# Patient Record
Sex: MALE | Race: WHITE | Employment: FULL TIME | URBAN - METROPOLITAN AREA
[De-identification: names, ages, dates, MRNs, and addresses within clinical notes are randomized per-mention and may not be internally consistent; named-entity substitution may affect disease eponyms.]

---

## 2017-01-17 DIAGNOSIS — I10 ESSENTIAL HYPERTENSION: Primary | ICD-10-CM

## 2017-01-17 RX ORDER — LISINOPRIL 20 MG/1
TABLET ORAL
Qty: 180 TABLET | Refills: 0 | Status: SHIPPED | OUTPATIENT
Start: 2017-01-17 | End: 2017-02-16 | Stop reason: SDUPTHER

## 2017-02-03 RX ORDER — METOPROLOL SUCCINATE 50 MG/1
TABLET, EXTENDED RELEASE ORAL
Qty: 180 TABLET | Refills: 0 | Status: SHIPPED | OUTPATIENT
Start: 2017-02-03 | End: 2017-02-16 | Stop reason: SDUPTHER

## 2017-07-13 ENCOUNTER — TELEPHONE (OUTPATIENT)
Dept: FAMILY MEDICINE CLINIC | Age: 52
End: 2017-07-13

## 2017-07-17 RX ORDER — LISINOPRIL 40 MG/1
40 TABLET ORAL 2 TIMES DAILY
Qty: 60 TABLET | Refills: 0
Start: 2017-07-17 | End: 2017-08-18 | Stop reason: SDUPTHER

## 2017-07-25 ENCOUNTER — TELEPHONE (OUTPATIENT)
Dept: FAMILY MEDICINE CLINIC | Age: 52
End: 2017-07-25

## 2017-07-28 ENCOUNTER — TELEPHONE (OUTPATIENT)
Dept: FAMILY MEDICINE CLINIC | Age: 52
End: 2017-07-28

## 2017-07-28 RX ORDER — AMLODIPINE BESYLATE 10 MG/1
10 TABLET ORAL DAILY
Qty: 30 TABLET | Refills: 3 | Status: SHIPPED | OUTPATIENT
Start: 2017-07-28 | End: 2018-08-24 | Stop reason: SDUPTHER

## 2017-08-17 ENCOUNTER — TELEPHONE (OUTPATIENT)
Dept: FAMILY MEDICINE CLINIC | Age: 52
End: 2017-08-17

## 2017-08-18 RX ORDER — LISINOPRIL 40 MG/1
40 TABLET ORAL 2 TIMES DAILY
Qty: 180 TABLET | Refills: 0 | Status: SHIPPED | OUTPATIENT
Start: 2017-08-18 | End: 2017-10-29 | Stop reason: SDUPTHER

## 2017-09-19 ENCOUNTER — TELEPHONE (OUTPATIENT)
Dept: FAMILY MEDICINE CLINIC | Age: 52
End: 2017-09-19

## 2017-09-19 DIAGNOSIS — I10 UNCONTROLLED HYPERTENSION: Primary | ICD-10-CM

## 2017-09-19 NOTE — TELEPHONE ENCOUNTER
Per conversation Thursday or Friday 9/14 or 9/15 with pt - call was dropped; pt's wife called to find out sounded like maybe do some more tests regarding pt's blood pressure.     Please call and advise

## 2017-09-26 NOTE — TELEPHONE ENCOUNTER
So according to CT @ [facility] a renal CTA only shows arteries, need to know what you are looking for? May need to order CTA abdomen or CTA abdomen & pelvis.  Please advise

## 2017-09-29 NOTE — TELEPHONE ENCOUNTER
Spoke with patient, advised test approved, but to be done @ [facility] @ [place]. Patient will discuss this with wife and call back office on Monday 10/2/17.

## 2017-11-01 RX ORDER — LISINOPRIL 40 MG/1
TABLET ORAL
Qty: 180 TABLET | Refills: 0 | Status: SHIPPED | OUTPATIENT
Start: 2017-11-01 | End: 2018-03-27 | Stop reason: SDUPTHER

## 2018-03-14 ENCOUNTER — OFFICE VISIT (OUTPATIENT)
Dept: FAMILY MEDICINE CLINIC | Age: 53
End: 2018-03-14

## 2018-03-14 VITALS
OXYGEN SATURATION: 98 % | WEIGHT: 251 LBS | BODY MASS INDEX: 31.21 KG/M2 | HEIGHT: 75 IN | SYSTOLIC BLOOD PRESSURE: 150 MMHG | DIASTOLIC BLOOD PRESSURE: 100 MMHG | HEART RATE: 55 BPM

## 2018-03-14 DIAGNOSIS — F17.200 TOBACCO DEPENDENCE: ICD-10-CM

## 2018-03-14 DIAGNOSIS — I10 ESSENTIAL HYPERTENSION: Primary | ICD-10-CM

## 2018-03-14 PROCEDURE — 99214 OFFICE O/P EST MOD 30 MIN: CPT | Performed by: FAMILY MEDICINE

## 2018-03-14 RX ORDER — M-VIT,TX,IRON,MINS/CALC/FOLIC 27MG-0.4MG
1 TABLET ORAL DAILY
COMMUNITY

## 2018-03-14 RX ORDER — ASPIRIN 81 MG/1
81 TABLET, CHEWABLE ORAL DAILY
COMMUNITY

## 2018-03-14 RX ORDER — CLONIDINE HYDROCHLORIDE 0.1 MG/1
0.1 TABLET ORAL 2 TIMES DAILY
Qty: 60 TABLET | Refills: 3 | Status: SHIPPED | OUTPATIENT
Start: 2018-03-14 | End: 2018-03-21 | Stop reason: DRUGHIGH

## 2018-03-14 RX ORDER — METOPROLOL SUCCINATE 100 MG/1
100 TABLET, EXTENDED RELEASE ORAL 2 TIMES DAILY
Qty: 180 TABLET | Refills: 1
Start: 2018-03-14 | End: 2018-04-28 | Stop reason: SDUPTHER

## 2018-03-14 ASSESSMENT — PATIENT HEALTH QUESTIONNAIRE - PHQ9
SUM OF ALL RESPONSES TO PHQ QUESTIONS 1-9: 0
SUM OF ALL RESPONSES TO PHQ9 QUESTIONS 1 & 2: 0
1. LITTLE INTEREST OR PLEASURE IN DOING THINGS: 0
2. FEELING DOWN, DEPRESSED OR HOPELESS: 0

## 2018-03-16 ENCOUNTER — TELEPHONE (OUTPATIENT)
Dept: FAMILY MEDICINE CLINIC | Age: 53
End: 2018-03-16

## 2018-03-16 NOTE — TELEPHONE ENCOUNTER
Per Jorgito Smoker would like to know if the U/S retroperitoneal dylan pre-cert is completed? Pt is currently there.     Please call

## 2018-03-21 ENCOUNTER — TELEPHONE (OUTPATIENT)
Dept: FAMILY MEDICINE CLINIC | Age: 53
End: 2018-03-21

## 2018-03-21 RX ORDER — CLONIDINE HYDROCHLORIDE 0.2 MG/1
0.2 TABLET ORAL 2 TIMES DAILY
Qty: 60 TABLET | Refills: 3 | Status: SHIPPED | OUTPATIENT
Start: 2018-03-21 | End: 2019-08-28

## 2018-03-21 NOTE — TELEPHONE ENCOUNTER
cloNIDine (CATAPRES) 0.1 MG tablet 60 tablet 3 3/14/2018     Sig - Route: Take 1 tablet by mouth 2 times daily - Oral      Pt's wife calling to state that pt's BP have been averaging very high. Averaging 150/108. Last night was 174/101  This morning 141/101    Would like to know if med above should be increased?     Please call and let her know    [id number]  [address]el[address]za [address]hen[address] [facility] [phone]

## 2018-04-13 ENCOUNTER — TELEPHONE (OUTPATIENT)
Dept: FAMILY MEDICINE CLINIC | Age: 53
End: 2018-04-13

## 2018-04-13 RX ORDER — HYDRALAZINE HYDROCHLORIDE 10 MG/1
10 TABLET, FILM COATED ORAL 3 TIMES DAILY
Qty: 90 TABLET | Refills: 3 | Status: SHIPPED | OUTPATIENT
Start: 2018-04-13 | End: 2018-08-24 | Stop reason: SDUPTHER

## 2018-04-16 ENCOUNTER — TELEPHONE (OUTPATIENT)
Dept: FAMILY MEDICINE CLINIC | Age: 53
End: 2018-04-16

## 2018-04-16 DIAGNOSIS — I10 UNCONTROLLED HYPERTENSION: Primary | ICD-10-CM

## 2018-04-25 ENCOUNTER — HOSPITAL ENCOUNTER (OUTPATIENT)
Dept: OTHER | Age: 53
Discharge: HOME | End: 2018-04-25
Attending: INTERNAL MEDICINE | Admitting: INTERNAL MEDICINE

## 2018-04-25 LAB
BILIRUBIN URINE: NEGATIVE
BLOOD, URINE: NEGATIVE
CLARITY: CLEAR
COLOR: YELLOW
CORTISOL TOTAL: 2.6 UG/DL
GLUCOSE URINE: NEGATIVE MG/DL
KETONES, URINE: NEGATIVE MG/DL
LEUKOCYTE ESTERASE, URINE: NEGATIVE
MICROSCOPIC EXAMINATION: NORMAL
NITRITE, URINE: NEGATIVE
PARATHYROID HORMONE INTACT: 38.7 PG/ML (ref 14–72)
PH UA: 6
PROTEIN UA: NEGATIVE MG/DL
SPECIFIC GRAVITY UA: 1.02
TSH SERPL DL<=0.05 MIU/L-ACNC: 3.94 UIU/ML (ref 0.27–4.2)
URINE TYPE: NORMAL
UROBILINOGEN, URINE: 0.2 E.U./DL

## 2018-04-26 ENCOUNTER — OFFICE VISIT (OUTPATIENT)
Dept: FAMILY MEDICINE CLINIC | Age: 53
End: 2018-04-26

## 2018-04-26 VITALS
SYSTOLIC BLOOD PRESSURE: 170 MMHG | BODY MASS INDEX: 29.18 KG/M2 | HEART RATE: 59 BPM | DIASTOLIC BLOOD PRESSURE: 112 MMHG | OXYGEN SATURATION: 98 % | WEIGHT: 235 LBS

## 2018-04-26 DIAGNOSIS — I10 ESSENTIAL HYPERTENSION: Primary | ICD-10-CM

## 2018-04-26 PROCEDURE — 99212 OFFICE O/P EST SF 10 MIN: CPT | Performed by: FAMILY MEDICINE

## 2018-04-26 RX ORDER — HYDROCHLOROTHIAZIDE 12.5 MG/1
CAPSULE, GELATIN COATED ORAL
COMMUNITY
Start: 2018-04-25 | End: 2019-08-28 | Stop reason: SDUPTHER

## 2018-04-26 RX ORDER — SPIRONOLACTONE 50 MG/1
TABLET, FILM COATED ORAL
COMMUNITY
Start: 2018-04-25 | End: 2019-08-16 | Stop reason: DRUGHIGH

## 2018-05-03 LAB
CATECHOLAMINE INTERPRETATION, PLASMA: NORMAL
DOPAMINE PLASMA: <20 PG/ML (ref 0–20)
EPINEPHRINE PLASMA: 21 PG/ML (ref 10–200)
NOREPINEPHRINE: 375 PG/ML (ref 80–520)

## 2018-05-04 LAB — ALDOSTERONE: 8.9 NG/DL

## 2018-05-07 LAB — RENIN ACTIVITY: 0.2 NG/ML/HR

## 2018-08-17 ENCOUNTER — OFFICE VISIT (OUTPATIENT)
Dept: FAMILY MEDICINE CLINIC | Age: 53
End: 2018-08-17

## 2018-08-17 VITALS
BODY MASS INDEX: 29.92 KG/M2 | OXYGEN SATURATION: 98 % | HEART RATE: 56 BPM | SYSTOLIC BLOOD PRESSURE: 110 MMHG | DIASTOLIC BLOOD PRESSURE: 86 MMHG | WEIGHT: 241 LBS

## 2018-08-17 DIAGNOSIS — R20.2 PARESTHESIA OF UPPER LIMB: ICD-10-CM

## 2018-08-17 DIAGNOSIS — M79.644 THUMB PAIN, RIGHT: Primary | ICD-10-CM

## 2018-08-17 DIAGNOSIS — M54.2 NECK PAIN: ICD-10-CM

## 2018-08-17 PROCEDURE — 99213 OFFICE O/P EST LOW 20 MIN: CPT | Performed by: FAMILY MEDICINE
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
## 2018-08-24 RX ORDER — HYDRALAZINE HYDROCHLORIDE 10 MG/1
10 TABLET, FILM COATED ORAL 3 TIMES DAILY
Qty: 90 TABLET | Refills: 3 | Status: SHIPPED | OUTPATIENT
Start: 2018-08-24 | End: 2018-11-21 | Stop reason: SDUPTHER

## 2018-08-24 RX ORDER — AMLODIPINE BESYLATE 10 MG/1
TABLET ORAL
Qty: 90 TABLET | Refills: 2 | Status: SHIPPED | OUTPATIENT
Start: 2018-08-24 | End: 2019-05-12 | Stop reason: SDUPTHER

## 2018-11-19 ENCOUNTER — PROCEDURE VISIT (OUTPATIENT)
Dept: NEUROLOGY | Age: 53
End: 2018-11-19
Payer: COMMERCIAL

## 2018-11-19 DIAGNOSIS — G56.23 ULNAR NEUROPATHY OF BOTH UPPER EXTREMITIES: Primary | ICD-10-CM

## 2018-11-19 PROCEDURE — 95911 NRV CNDJ TEST 9-10 STUDIES: CPT | Performed by: PSYCHIATRY & NEUROLOGY

## 2018-11-19 PROCEDURE — 95886 MUSC TEST DONE W/N TEST COMP: CPT | Performed by: PSYCHIATRY & NEUROLOGY

## 2018-11-19 NOTE — PROGRESS NOTES
Talia Campos M.D. [facility]) Physicians/[facility]  Board Certified in [address] [facility] [address] [facility], [address] [facility], [address] [facility]    EMG / NERVE CONDUCTION STUDY    PATIENT:     Rosey Bond      DATE OF EM2018    YOB: 1965       REASON FOR EMG:    Bilateral arm numbness    REFERRING PHYSICIAN:  Omaira Chaidez MD  [facility] [address] [facility], [address]    SUMMARY:  Bilateral median sensory nerve studies had slightly prolonged distal latencies. Bilateral median motor nerve studies were normal.  Bilateral ulnar sensory studies and borderline distal latencies. Bilateral ulnar motor nerve studies had moderately severe slowing of conduction velocities across the elbow. The right radial sensory nerve study was normal.  Needle EMG of several muscles in both upper extremities including the cervical paraspinal muscles was normal.     CLINICAL DIAGNOSIS:  Unspecified neuropathy     EMG RESULTS:   1.  Moderately severe bilateral ulnar nerve lesions at the elbow. The right side is slightly more involved than the left side. 2.  Mild bilateral median nerve lesions at the wrist.  (Carpal tunnel syndrome). _____________________________  Talia Campos M.D.   Electromyographer/Neurologist

## 2018-11-20 ENCOUNTER — TELEPHONE (OUTPATIENT)
Dept: FAMILY MEDICINE CLINIC | Age: 53
End: 2018-11-20

## 2018-11-20 DIAGNOSIS — M54.2 NECK PAIN: Primary | ICD-10-CM

## 2018-11-21 RX ORDER — HYDRALAZINE HYDROCHLORIDE 10 MG/1
10 TABLET, FILM COATED ORAL 3 TIMES DAILY
Qty: 270 TABLET | Refills: 0 | Status: SHIPPED | OUTPATIENT
Start: 2018-11-21 | End: 2019-02-18 | Stop reason: SDUPTHER

## 2018-12-13 RX ORDER — LISINOPRIL 40 MG/1
TABLET ORAL
Qty: 180 TABLET | Refills: 1 | Status: SHIPPED | OUTPATIENT
Start: 2018-12-13 | End: 2019-07-01 | Stop reason: SDUPTHER

## 2019-01-28 RX ORDER — METOPROLOL SUCCINATE 100 MG/1
TABLET, EXTENDED RELEASE ORAL
Qty: 180 TABLET | Refills: 2 | Status: SHIPPED | OUTPATIENT
Start: 2019-01-28 | End: 2019-08-28 | Stop reason: SDUPTHER

## 2019-02-08 ENCOUNTER — HOSPITAL ENCOUNTER (OUTPATIENT)
Dept: MRI IMAGING | Age: 54
Discharge: HOME | End: 2019-02-08
Payer: COMMERCIAL

## 2019-02-08 DIAGNOSIS — M54.12 BRACHIAL NEURITIS: ICD-10-CM

## 2019-02-08 DIAGNOSIS — R55 SYNCOPE AND COLLAPSE: ICD-10-CM

## 2019-02-08 PROCEDURE — 70547 MR ANGIOGRAPHY NECK W/O DYE: CPT

## 2019-02-08 PROCEDURE — 72141 MRI NECK SPINE W/O DYE: CPT

## 2019-02-18 RX ORDER — HYDRALAZINE HYDROCHLORIDE 10 MG/1
10 TABLET, FILM COATED ORAL 3 TIMES DAILY
Qty: 270 TABLET | Refills: 0 | Status: SHIPPED | OUTPATIENT
Start: 2019-02-18 | End: 2019-05-14 | Stop reason: SDUPTHER

## 2019-05-13 RX ORDER — AMLODIPINE BESYLATE 10 MG/1
TABLET ORAL
Qty: 90 TABLET | Refills: 0 | Status: SHIPPED | OUTPATIENT
Start: 2019-05-13 | End: 2019-08-10 | Stop reason: SDUPTHER

## 2019-05-15 RX ORDER — HYDRALAZINE HYDROCHLORIDE 10 MG/1
TABLET, FILM COATED ORAL
Qty: 270 TABLET | Refills: 0 | Status: SHIPPED | OUTPATIENT
Start: 2019-05-15 | End: 2019-08-10 | Stop reason: SDUPTHER

## 2019-07-01 RX ORDER — LISINOPRIL 40 MG/1
TABLET ORAL
Qty: 60 TABLET | Refills: 0 | Status: SHIPPED | OUTPATIENT
Start: 2019-07-01 | End: 2019-08-02 | Stop reason: SDUPTHER

## 2019-07-19 ENCOUNTER — OFFICE VISIT (OUTPATIENT)
Dept: FAMILY MEDICINE CLINIC | Age: 54
End: 2019-07-19
Payer: COMMERCIAL

## 2019-07-19 VITALS
BODY MASS INDEX: 31.04 KG/M2 | SYSTOLIC BLOOD PRESSURE: 108 MMHG | WEIGHT: 250 LBS | DIASTOLIC BLOOD PRESSURE: 70 MMHG | HEART RATE: 74 BPM | OXYGEN SATURATION: 96 %

## 2019-07-19 DIAGNOSIS — M79.671 RIGHT FOOT PAIN: Primary | ICD-10-CM

## 2019-07-19 DIAGNOSIS — I10 ESSENTIAL HYPERTENSION: ICD-10-CM

## 2019-07-19 PROCEDURE — 99213 OFFICE O/P EST LOW 20 MIN: CPT | Performed by: FAMILY MEDICINE

## 2019-07-19 ASSESSMENT — PATIENT HEALTH QUESTIONNAIRE - PHQ9
SUM OF ALL RESPONSES TO PHQ QUESTIONS 1-9: 0
SUM OF ALL RESPONSES TO PHQ9 QUESTIONS 1 & 2: 0
1. LITTLE INTEREST OR PLEASURE IN DOING THINGS: 0
SUM OF ALL RESPONSES TO PHQ QUESTIONS 1-9: 0
2. FEELING DOWN, DEPRESSED OR HOPELESS: 0

## 2019-07-29 ENCOUNTER — HOSPITAL ENCOUNTER (OUTPATIENT)
Age: 54
Discharge: HOME | End: 2019-07-29
Payer: COMMERCIAL

## 2019-07-29 DIAGNOSIS — N28.9 ABNORMAL KIDNEY FUNCTION: Primary | ICD-10-CM

## 2019-07-29 DIAGNOSIS — I10 ESSENTIAL HYPERTENSION: ICD-10-CM

## 2019-07-29 LAB
A/G RATIO: 1.6 (ref 1.1–2.2)
ALBUMIN SERPL-MCNC: 4.4 G/DL (ref 3.4–5)
ALP BLD-CCNC: 53 U/L (ref 40–129)
ALT SERPL-CCNC: 30 U/L (ref 10–40)
ANION GAP SERPL CALCULATED.3IONS-SCNC: 12 MMOL/L (ref 3–16)
AST SERPL-CCNC: 22 U/L (ref 15–37)
BILIRUB SERPL-MCNC: 0.8 MG/DL (ref 0–1)
BUN BLDV-MCNC: 32 MG/DL (ref 7–20)
CALCIUM SERPL-MCNC: 9.5 MG/DL (ref 8.3–10.6)
CHLORIDE BLD-SCNC: 105 MMOL/L (ref 99–110)
CHOLESTEROL, TOTAL: 144 MG/DL (ref 0–199)
CO2: 23 MMOL/L (ref 21–32)
CREAT SERPL-MCNC: 1.4 MG/DL (ref 0.9–1.3)
GFR AFRICAN AMERICAN: >60
GFR NON-AFRICAN AMERICAN: 53
GLOBULIN: 2.8 G/DL
GLUCOSE BLD-MCNC: 108 MG/DL (ref 70–99)
HDLC SERPL-MCNC: 43 MG/DL (ref 40–60)
LDL CHOLESTEROL CALCULATED: 72 MG/DL
POTASSIUM SERPL-SCNC: 5 MMOL/L (ref 3.5–5.1)
SODIUM BLD-SCNC: 140 MMOL/L (ref 136–145)
TOTAL PROTEIN: 7.2 G/DL (ref 6.4–8.2)
TRIGL SERPL-MCNC: 143 MG/DL (ref 0–150)
VLDLC SERPL CALC-MCNC: 29 MG/DL

## 2019-07-29 PROCEDURE — 80053 COMPREHEN METABOLIC PANEL: CPT

## 2019-07-29 PROCEDURE — 80061 LIPID PANEL: CPT

## 2019-07-29 PROCEDURE — 36415 COLL VENOUS BLD VENIPUNCTURE: CPT

## 2019-08-02 RX ORDER — LISINOPRIL 40 MG/1
TABLET ORAL
Qty: 60 TABLET | Refills: 12 | Status: SHIPPED | OUTPATIENT
Start: 2019-08-02 | End: 2020-08-25

## 2019-08-05 ENCOUNTER — HOSPITAL ENCOUNTER (OUTPATIENT)
Age: 54
Discharge: HOME | End: 2019-08-05
Payer: COMMERCIAL

## 2019-08-05 DIAGNOSIS — N28.9 ABNORMAL KIDNEY FUNCTION: ICD-10-CM

## 2019-08-05 LAB
ANION GAP SERPL CALCULATED.3IONS-SCNC: 11 MMOL/L (ref 3–16)
BUN BLDV-MCNC: 25 MG/DL (ref 7–20)
CALCIUM SERPL-MCNC: 9.4 MG/DL (ref 8.3–10.6)
CHLORIDE BLD-SCNC: 105 MMOL/L (ref 99–110)
CO2: 23 MMOL/L (ref 21–32)
CREAT SERPL-MCNC: 1.3 MG/DL (ref 0.9–1.3)
GFR AFRICAN AMERICAN: >60
GFR NON-AFRICAN AMERICAN: 57
GLUCOSE BLD-MCNC: 100 MG/DL (ref 70–99)
POTASSIUM SERPL-SCNC: 5.7 MMOL/L (ref 3.5–5.1)
SODIUM BLD-SCNC: 139 MMOL/L (ref 136–145)

## 2019-08-05 PROCEDURE — 80048 BASIC METABOLIC PNL TOTAL CA: CPT

## 2019-08-05 PROCEDURE — 36415 COLL VENOUS BLD VENIPUNCTURE: CPT

## 2019-08-06 ENCOUNTER — PATIENT MESSAGE (OUTPATIENT)
Dept: FAMILY MEDICINE CLINIC | Age: 54
End: 2019-08-06

## 2019-08-06 RX ORDER — SPIRONOLACTONE 50 MG/1
TABLET, FILM COATED ORAL
Qty: 30 TABLET | Status: CANCELLED | OUTPATIENT
Start: 2019-08-06

## 2019-08-06 NOTE — TELEPHONE ENCOUNTER
From: Katie Luke  To: Saray Dawson MD  Sent: 8/6/2019 10:22 AM EDT  Subject: Prescription Question    Spironolactone 100 mg needs refilled. [facility]said they have contacted office for refill with no response. Have 3 days left of medication. Thank you.

## 2019-08-07 RX ORDER — SPIRONOLACTONE 50 MG/1
50 TABLET, FILM COATED ORAL DAILY
Qty: 30 TABLET | Refills: 12 | Status: CANCELLED | OUTPATIENT
Start: 2019-08-07

## 2019-08-10 RX ORDER — AMLODIPINE BESYLATE 10 MG/1
TABLET ORAL
Qty: 90 TABLET | Refills: 3 | Status: SHIPPED | OUTPATIENT
Start: 2019-08-10 | End: 2020-08-12

## 2019-08-10 RX ORDER — HYDRALAZINE HYDROCHLORIDE 10 MG/1
TABLET, FILM COATED ORAL
Qty: 270 TABLET | Refills: 3 | Status: SHIPPED | OUTPATIENT
Start: 2019-08-10 | End: 2020-07-30

## 2019-08-10 NOTE — TELEPHONE ENCOUNTER
Medication:   Requested Prescriptions     Pending Prescriptions Disp Refills    hydrALAZINE (APRESOLINE) 10 MG tablet [Pharmacy Med Name: HYDRALAZINE 10 MG TABLET] 270 tablet 0     Sig: TAKE 1 TABLET BY MOUTH THREE TIMES A DAY    amLODIPine (NORVASC) 10 MG tablet [Pharmacy Med Name: AMLODIPINE BESYLATE 10 MG TAB] 90 tablet 0     Sig: TAKE 1 TABLET BY MOUTH EVERY DAY      Last Filled:  5/13/2019    Patient Phone Number: [phone] (home)     Last appt: 7/19/2019   Next appt: 8/28/2019    Last OARRS: No flowsheet data found.     Preferred Pharmacy:   [address] [place], [place] [address] [address]  Phone: [phone] Fax: [phone]

## 2019-08-16 RX ORDER — SPIRONOLACTONE 100 MG/1
100 TABLET, FILM COATED ORAL DAILY
Qty: 90 TABLET | Refills: 3
Start: 2019-08-16 | End: 2020-09-02 | Stop reason: SDUPTHER

## 2019-08-28 ENCOUNTER — OFFICE VISIT (OUTPATIENT)
Dept: FAMILY MEDICINE CLINIC | Age: 54
End: 2019-08-28
Payer: COMMERCIAL

## 2019-08-28 VITALS
WEIGHT: 253 LBS | HEART RATE: 65 BPM | DIASTOLIC BLOOD PRESSURE: 82 MMHG | BODY MASS INDEX: 31.41 KG/M2 | SYSTOLIC BLOOD PRESSURE: 110 MMHG | OXYGEN SATURATION: 96 %

## 2019-08-28 DIAGNOSIS — Z00.00 WELL ADULT EXAM: Primary | ICD-10-CM

## 2019-08-28 DIAGNOSIS — Z12.5 SCREENING PSA (PROSTATE SPECIFIC ANTIGEN): ICD-10-CM

## 2019-08-28 DIAGNOSIS — N18.30 CKD (CHRONIC KIDNEY DISEASE) STAGE 3, GFR 30-59 ML/MIN (HCC): ICD-10-CM

## 2019-08-28 DIAGNOSIS — F17.200 TOBACCO DEPENDENCE: ICD-10-CM

## 2019-08-28 DIAGNOSIS — I10 ESSENTIAL HYPERTENSION: ICD-10-CM

## 2019-08-28 PROCEDURE — 99396 PREV VISIT EST AGE 40-64: CPT | Performed by: FAMILY MEDICINE

## 2019-08-28 RX ORDER — HYDROCHLOROTHIAZIDE 12.5 MG/1
12.5 CAPSULE, GELATIN COATED ORAL EVERY MORNING
Qty: 90 CAPSULE | Refills: 3 | Status: SHIPPED | OUTPATIENT
Start: 2019-08-28 | End: 2020-09-02 | Stop reason: SDUPTHER

## 2019-08-28 RX ORDER — METOPROLOL SUCCINATE 100 MG/1
TABLET, EXTENDED RELEASE ORAL
Qty: 180 TABLET | Refills: 3 | Status: SHIPPED | OUTPATIENT
Start: 2019-08-28 | End: 2020-09-02 | Stop reason: SDUPTHER

## 2019-08-28 NOTE — PROGRESS NOTES
Here for annual physical.    Dental: has been at least 5 years  Eye: has been at least 5 years    Colonoscopy: up-to-date    Exercise: nothing   Diet: balanced     Checks BP at home: No  Taking medication daily: Yes  Side Effects of medication: no    Patient's medications, allergies, past medical, surgical, social and family histories were reviewed and updated in the EHR as appropriate. No CP, no palpitations, no sob, no edema, no cough, no change in bowel or bladder,  no nausea, no vomiting, no abdominal pain    HM reviewed with pt    Patient's medications, allergies, past medical, surgical, social and family histories were reviewed and updated in the EHR as appropriate. Body mass index is 31.41 kg/m². Vitals:    08/28/19 1532   Pulse: 65   SpO2: 96%   Weight: 253 lb (114.8 kg)     Wt Readings from Last 3 Encounters:   08/28/19 253 lb (114.8 kg)   07/19/19 250 lb (113.4 kg)   08/17/18 241 lb (109.3 kg)     PHQ score: PHQ-9 Total Score: 0 (7/19/2019  4:08 PM)      GENERAL:Alert and oriented x 4 NAD, affect appropriate and obese, well hydrated, well developed. NECK:supple and non tender without mass, no thyromegaly or thyroid nodules, no cervical lymphadenopathy  LUNG:clear to auscultation bilaterally with normal respiratory effort  CV: Normal heart sounds, regular rate and rhythm without murmurs  EXTREMETY: no loss of hair, no edema, normal pedal pulses bilaterally      ASSESSMENT AND PLAN:       Kash Baron was seen today for annual exam.    Diagnoses and all orders for this visit:    Well adult exam  Recommended screenings discussed and ordered if patient agreed  Recommended vaccinations discussed and ordered if patient agreed  Encouraged healthy diet   Encouraged regular exercise and maintaining a healthy weight    Essential hypertension  -     Basic Metabolic Panel; Future  -Stable, continue current medications. Reviewed recent labs with patient.      Tobacco dependence  Doing well with

## 2020-06-17 ENCOUNTER — TELEPHONE (OUTPATIENT)
Dept: FAMILY MEDICINE CLINIC | Age: 55
End: 2020-06-17

## 2020-07-30 RX ORDER — HYDRALAZINE HYDROCHLORIDE 10 MG/1
TABLET, FILM COATED ORAL
Qty: 270 TABLET | Refills: 3 | Status: SHIPPED | OUTPATIENT
Start: 2020-07-30 | End: 2020-09-02 | Stop reason: DRUGHIGH

## 2020-07-30 NOTE — TELEPHONE ENCOUNTER
Medication:   Requested Prescriptions     Pending Prescriptions Disp Refills    hydrALAZINE (APRESOLINE) 10 MG tablet [Pharmacy Med Name: HYDRALAZINE 10 MG TABLET] 270 tablet 3     Sig: TAKE 1 TABLET BY MOUTH THREE TIMES A DAY      Last Filled:  8/10/19    Patient Phone Number: [phone] (home)     Last appt: 8/28/2019   Next appt: Visit date not found    Last OARRS: No flowsheet data found.   PDMP Monitoring:    Last PDMP Ryan crawford Reviewed [facility]):  Review User Review Instant Review Result          Preferred Pharmacy:   [address] [place] [address], [address] [place] [address]  [address] [place] [address]  Phone: [phone] Fax: [phone]

## 2020-08-12 RX ORDER — AMLODIPINE BESYLATE 10 MG/1
TABLET ORAL
Qty: 90 TABLET | Refills: 0 | Status: SHIPPED | OUTPATIENT
Start: 2020-08-12 | End: 2020-09-01 | Stop reason: SDUPTHER

## 2020-08-21 ENCOUNTER — HOSPITAL ENCOUNTER (OUTPATIENT)
Age: 55
Discharge: HOME | End: 2020-08-21
Payer: COMMERCIAL

## 2020-08-21 LAB
A/G RATIO: 1.5 (ref 1.1–2.2)
ALBUMIN SERPL-MCNC: 4.3 G/DL (ref 3.4–5)
ALP BLD-CCNC: 53 U/L (ref 40–129)
ALT SERPL-CCNC: 24 U/L (ref 10–40)
ANION GAP SERPL CALCULATED.3IONS-SCNC: 8 MMOL/L (ref 3–16)
AST SERPL-CCNC: 19 U/L (ref 15–37)
BILIRUB SERPL-MCNC: 0.6 MG/DL (ref 0–1)
BUN BLDV-MCNC: 24 MG/DL (ref 7–20)
CALCIUM SERPL-MCNC: 9.5 MG/DL (ref 8.3–10.6)
CHLORIDE BLD-SCNC: 103 MMOL/L (ref 99–110)
CHOLESTEROL, TOTAL: 150 MG/DL (ref 0–199)
CO2: 25 MMOL/L (ref 21–32)
CREAT SERPL-MCNC: 1.3 MG/DL (ref 0.9–1.3)
GFR AFRICAN AMERICAN: >60
GFR NON-AFRICAN AMERICAN: 57
GLOBULIN: 2.9 G/DL
GLUCOSE BLD-MCNC: 107 MG/DL (ref 70–99)
HDLC SERPL-MCNC: 38 MG/DL (ref 40–60)
LDL CHOLESTEROL CALCULATED: 85 MG/DL
POTASSIUM SERPL-SCNC: 5.4 MMOL/L (ref 3.5–5.1)
PROSTATE SPECIFIC ANTIGEN: 0.34 NG/ML (ref 0–4)
SODIUM BLD-SCNC: 136 MMOL/L (ref 136–145)
TOTAL PROTEIN: 7.2 G/DL (ref 6.4–8.2)
TRIGL SERPL-MCNC: 133 MG/DL (ref 0–150)
VLDLC SERPL CALC-MCNC: 27 MG/DL

## 2020-08-21 PROCEDURE — 36415 COLL VENOUS BLD VENIPUNCTURE: CPT

## 2020-08-21 PROCEDURE — 84153 ASSAY OF PSA TOTAL: CPT

## 2020-08-21 PROCEDURE — 80061 LIPID PANEL: CPT

## 2020-08-21 PROCEDURE — 80053 COMPREHEN METABOLIC PANEL: CPT

## 2020-08-25 RX ORDER — LISINOPRIL 40 MG/1
TABLET ORAL
Qty: 180 TABLET | Refills: 0 | Status: SHIPPED | OUTPATIENT
Start: 2020-08-25 | End: 2020-09-01 | Stop reason: SDUPTHER

## 2020-09-01 NOTE — PROGRESS NOTES
Patient is being seen Virtually using [employer]. Patient is at home and Dr. Bre Herndon is at the home. The patient has consented to having a virtual visit due to the [place] 19 pandemic. Vitals are patient reported. Chief Complaint   Patient presents with    Hypertension      Bety Blandon is a 63 y.o. male who presents for follow-up of HTN. Checks BP at home: Yes  BP numbers: states for past 6 months has been low ( SBP) so he cut back to lisinoprill once a day and hydralazaine BID. Still running low, was 95/66 last night. States energy level is low but   Taking medication daily: Yes  Side Effects of medication: no      Has been having \"extreme\" leg cramps. States has had forever but states now getting during day as well as at night. Bilateral thighs down. Has been having more knee pain milvia on left. Has had injection in past and helped but needs another. Hard to go down steps. Takes ibuprofen and helps. HAving some \"spots\" on head, ears \"sun spots\". Scaly. Will get red and tender if sweating. Patient's medications, allergies, past medical, surgical, social and family histories were reviewed and updated in the EHR as appropriate. No CP, no palpitations, no sob, no edema, no cough, no change in bowel or bladder,  no nausea, no vomiting, no abdominal pain        Wt Readings from Last 3 Encounters:   08/28/19 253 lb (114.8 kg)   07/19/19 250 lb (113.4 kg)   08/17/18 241 lb (109.3 kg)     There is no height or weight on file to calculate BMI. PHQ Scores 7/19/2019 3/14/2018   PHQ2 Score 0 0   PHQ9 Score 0 0     Patient-Reported Vitals 9/2/2020   Patient-Reported Weight -   Patient-Reported Systolic 758   Patient-Reported Diastolic 77   Patient-Reported Pulse 53          GEN: Alert and oriented x 4 NAD, affect appropriate and obese, well developed. ASSESSMENT AND PLAN:       Rocio Arnold was seen today for hypertension.     Diagnoses and all orders for this visit:    Essential

## 2020-09-02 ENCOUNTER — VIRTUAL VISIT (OUTPATIENT)
Dept: FAMILY MEDICINE CLINIC | Age: 55
End: 2020-09-02
Payer: COMMERCIAL

## 2020-09-02 PROBLEM — F17.200 TOBACCO DEPENDENCE: Status: RESOLVED | Noted: 2018-03-14 | Resolved: 2020-09-02

## 2020-09-02 PROCEDURE — 99214 OFFICE O/P EST MOD 30 MIN: CPT | Performed by: FAMILY MEDICINE

## 2020-09-02 RX ORDER — AMLODIPINE BESYLATE 10 MG/1
TABLET ORAL
Qty: 90 TABLET | Refills: 3 | Status: SHIPPED | OUTPATIENT
Start: 2020-09-02 | End: 2021-09-27

## 2020-09-02 RX ORDER — SPIRONOLACTONE 100 MG/1
100 TABLET, FILM COATED ORAL DAILY
Qty: 90 TABLET | Refills: 3 | Status: SHIPPED | OUTPATIENT
Start: 2020-09-02 | End: 2021-11-01

## 2020-09-02 RX ORDER — METOPROLOL SUCCINATE 100 MG/1
100 TABLET, EXTENDED RELEASE ORAL DAILY
Qty: 90 TABLET | Refills: 3 | Status: SHIPPED | OUTPATIENT
Start: 2020-09-02 | End: 2020-09-09 | Stop reason: DRUGHIGH

## 2020-09-02 RX ORDER — LISINOPRIL 40 MG/1
40 TABLET ORAL DAILY
Qty: 90 TABLET | Refills: 3 | Status: SHIPPED | OUTPATIENT
Start: 2020-09-02 | End: 2021-04-26

## 2020-09-02 RX ORDER — HYDROCHLOROTHIAZIDE 12.5 MG/1
12.5 CAPSULE, GELATIN COATED ORAL EVERY MORNING
Qty: 90 CAPSULE | Refills: 3 | Status: SHIPPED | OUTPATIENT
Start: 2020-09-02 | End: 2021-09-27

## 2020-09-02 NOTE — PATIENT INSTRUCTIONS
Check with pharmacy about getting the shingles vaccine, Shingrix (not Zostavax)   Get your flu shot this year. Monitor blood pressure 2-3 times a week and drop off readings for review in 1 month  Goal BP is <140/80 for many people, good control is <130/80          Patient Education        Nighttime Leg Cramps: Care Instructions  Your Care Instructions     Nighttime leg cramps happen when a leg muscle tightens up suddenly. This most often happens in the calf. But cramps in the thigh or foot are also common. Cramps often occur just as you fall asleep or wake up. Leg cramps can be painful. They can last a few seconds to a few minutes. Though they are common, experts don't know exactly what causes them. To treat muscle cramps, you can stretch and massage the muscle. If cramps keep coming back, your doctor may prescribe medicine that relaxes your muscles. Follow-up care is a key part of your treatment and safety. Be sure to make and go to all appointments, and call your doctor if you are having problems. It's also a good idea to know your test results and keep a list of the medicines you take. How can you care for yourself at home? · To stop a leg cramp, sit down and straighten your leg as you bend your foot up toward your knee. It may help to place a rolled towel under the ball of your foot and, while you hold the towel at both ends, gently pull the towel toward you while you keep your knee straight. This stretches the calf muscles. The cramp usually goes away after a few minutes. · Take a warm shower or bath to relax the muscle. Some people find that a heating pad placed on the muscle can also help. Others get relief by rubbing the calf with an ice pack. · Stretch your muscles every day, especially before and after exercise and at bedtime. Regular stretching can relax your muscles and may prevent cramps. · Do not suddenly increase the amount of exercise you get.  Increase your exercise a little each week.  · If your doctor prescribes medicine, take it exactly as prescribed. Call your doctor if you think you are having a problem with your medicine. · Ask your doctor if you can take an over-the-counter pain medicine, such as acetaminophen (Tylenol), ibuprofen (Advil, Motrin), or naproxen (Aleve). Be safe with medicines. Read and follow all instructions on the label. · Drink plenty of fluids. If you have kidney, heart, or liver disease and have to limit fluids, talk with your doctor before you increase the amount of fluids you drink. When should you call for help? Watch closely for changes in your health, and be sure to contact your doctor if:  · You often have muscle cramps that do not go away after home treatment. · Your muscle cramps often wake you up at night. · You do not get better as expected. Where can you learn more? Go to https://[employer]. org and sign in to your [employer] account. Enter S910 in the Expandly box to learn more about \"Nighttime Leg Cramps: Care Instructions. \"     If you do not have an account, please click on the \"Sign Up Now\" link. Current as of: November 20, 2019               Content Version: 12.5  © 8747-7435 Healthwise, Incorporated. Care instructions adapted under license by [facility] ([facility]). If you have questions about a medical condition or this instruction, always ask your healthcare professional. [place] [address] any warranty or liability for your use of this information. Dermatologists:      DERMATOLOGY [phone]   MD Saba Han MD Lockie Mallard, MD Sherry Shook, MD Katheleen Moody, AIMEE [place], [place]       [facility]  [address] [facility] [facility] [address]  Phone: [phone]     Dr. Rolando Hoyt  [phone]  [address]  [place], [place]     Dr. Doneen Aschoff MD  [phone] [address] [place] [address]  Oneil Johnston [address] [place] [address].  Nicholas Brown MD  [phone]  [address], [address] [facility] [address] Lynelle [facility], [address] [place] [address]  [phone]

## 2020-09-09 RX ORDER — METOPROLOL SUCCINATE 100 MG/1
100 TABLET, EXTENDED RELEASE ORAL DAILY
Qty: 90 TABLET | Refills: 3 | Status: SHIPPED
Start: 2020-09-09 | End: 2020-10-19 | Stop reason: SDUPTHER

## 2020-10-19 RX ORDER — METOPROLOL SUCCINATE 100 MG/1
100 TABLET, EXTENDED RELEASE ORAL DAILY
Qty: 90 TABLET | Refills: 3 | Status: SHIPPED | OUTPATIENT
Start: 2020-10-19 | End: 2021-09-27

## 2021-03-15 ENCOUNTER — HOSPITAL ENCOUNTER (OUTPATIENT)
Age: 56
Discharge: HOME | End: 2021-03-15
Payer: COMMERCIAL

## 2021-03-15 ENCOUNTER — HOSPITAL ENCOUNTER (OUTPATIENT)
Dept: GENERAL RADIOLOGY | Age: 56
Discharge: HOME | End: 2021-03-15
Payer: COMMERCIAL

## 2021-03-15 DIAGNOSIS — R52 PAIN: ICD-10-CM

## 2021-03-15 PROCEDURE — 73560 X-RAY EXAM OF KNEE 1 OR 2: CPT

## 2021-04-26 RX ORDER — LISINOPRIL 40 MG/1
TABLET ORAL
Qty: 180 TABLET | Refills: 1 | Status: SHIPPED | OUTPATIENT
Start: 2021-04-26 | End: 2021-10-18

## 2021-04-26 NOTE — TELEPHONE ENCOUNTER
Medication:   Requested Prescriptions     Pending Prescriptions Disp Refills    lisinopril (PRINIVIL;ZESTRIL) 40 MG tablet [Pharmacy Med Name: LISINOPRIL 40 MG TABLET] 180 tablet 1     Sig: TAKE 1 TABLET BY MOUTH TWICE A DAY      Provider out of office until 4/27/21    Patient Phone Number: [phone] (home)     Last appt: 9/2/2020   Next appt: Visit date not found    Last OARRS: No flowsheet data found.   PDMP Monitoring:    Last PDMP Dru crawford Reviewed [facility]):  Review User Review Instant Review Result          Preferred Pharmacy:   [facility] [address] [facility], [address] [place] [address] Molina Garcia  Phone: [phone] Fax: [phone]    [facility] Sushil Garcia, [address] [phone] - F [phone]  [facility] [address]  Phone: [phone] Fax: [phone]

## 2021-09-27 RX ORDER — AMLODIPINE BESYLATE 10 MG/1
TABLET ORAL
Qty: 90 TABLET | Refills: 0 | Status: SHIPPED | OUTPATIENT
Start: 2021-09-27 | End: 2021-12-16 | Stop reason: SDUPTHER

## 2021-09-27 RX ORDER — METOPROLOL SUCCINATE 100 MG/1
TABLET, EXTENDED RELEASE ORAL
Qty: 90 TABLET | Refills: 0 | Status: SHIPPED | OUTPATIENT
Start: 2021-09-27 | End: 2021-12-16 | Stop reason: SDUPTHER

## 2021-09-27 RX ORDER — HYDROCHLOROTHIAZIDE 12.5 MG/1
CAPSULE, GELATIN COATED ORAL
Qty: 90 CAPSULE | Refills: 0 | Status: SHIPPED | OUTPATIENT
Start: 2021-09-27 | End: 2021-12-16 | Stop reason: SDUPTHER

## 2021-11-29 RX ORDER — LISINOPRIL 40 MG/1
40 TABLET ORAL 2 TIMES DAILY
Qty: 30 TABLET | Refills: 0 | Status: SHIPPED | OUTPATIENT
Start: 2021-11-29 | End: 2022-01-03 | Stop reason: SDUPTHER

## 2021-11-29 NOTE — TELEPHONE ENCOUNTER
Medication:   Requested Prescriptions     Pending Prescriptions Disp Refills    lisinopril (PRINIVIL;ZESTRIL) 40 MG tablet 180 tablet 0     Sig: Take 1 tablet by mouth 2 times daily Due for appt      Requesting 90 day vs 30    Patient Phone Number: [phone] (home)     Last appt: 9/2/2020   Next appt: Visit date not found    Last OARRS: No flowsheet data found.   PDMP Monitoring:    Last PDMP Joie Hernandez as Reviewed [facility]):  Review User Review Instant Review Result          Preferred Pharmacy:   [facility] [address] [facility], [address] [place] [address] Molina Garcia  Phone: [phone] Fax: [phone]    [facility] Sushil Garcia, [address] [phone] - F [phone]  [place] [address]  Phone: [phone] Fax: [phone]

## 2021-12-02 RX ORDER — LISINOPRIL 40 MG/1
40 TABLET ORAL 2 TIMES DAILY
Qty: 180 TABLET | Refills: 0 | OUTPATIENT
Start: 2021-12-02

## 2021-12-02 NOTE — TELEPHONE ENCOUNTER
Medication:   Requested Prescriptions      No prescriptions requested or ordered in this encounter      Pharmacy requesting 90 day vs 15 day    Patient Phone Number: [phone] (home)     Last appt: 9/2/2020   Next appt: Visit date not found    Last OARRS: No flowsheet data found.   PDMP Monitoring:    Last PDMP Major Revering as Reviewed [facility]):  Review User Review Instant Review Result          Preferred Pharmacy:   [facility]pharmacy [address], [address] [place] [address] Molina Garcia  Phone: [phone] Fax: [phone]    [facility] Sushil Garcia, [address] [phone] - F [phone]  [place] [address]  Phone: [phone] Fax: [phone]

## 2021-12-10 RX ORDER — LISINOPRIL 40 MG/1
40 TABLET ORAL 2 TIMES DAILY
Qty: 180 TABLET | Refills: 3 | OUTPATIENT
Start: 2021-12-10

## 2021-12-16 RX ORDER — SPIRONOLACTONE 100 MG/1
100 TABLET, FILM COATED ORAL DAILY
Qty: 30 TABLET | Refills: 0 | Status: SHIPPED | OUTPATIENT
Start: 2021-12-16 | End: 2022-01-03 | Stop reason: SDUPTHER

## 2021-12-16 RX ORDER — METOPROLOL SUCCINATE 100 MG/1
100 TABLET, EXTENDED RELEASE ORAL DAILY
Qty: 30 TABLET | Refills: 0 | Status: SHIPPED | OUTPATIENT
Start: 2021-12-16 | End: 2022-01-03 | Stop reason: SDUPTHER

## 2021-12-16 RX ORDER — AMLODIPINE BESYLATE 10 MG/1
10 TABLET ORAL DAILY
Qty: 30 TABLET | Refills: 0 | Status: SHIPPED | OUTPATIENT
Start: 2021-12-16 | End: 2022-01-03 | Stop reason: SDUPTHER

## 2021-12-16 RX ORDER — HYDROCHLOROTHIAZIDE 12.5 MG/1
12.5 CAPSULE, GELATIN COATED ORAL EVERY MORNING
Qty: 30 CAPSULE | Refills: 0 | Status: SHIPPED | OUTPATIENT
Start: 2021-12-16 | End: 2022-01-03 | Stop reason: SDUPTHER

## 2022-01-03 ENCOUNTER — OFFICE VISIT (OUTPATIENT)
Dept: FAMILY MEDICINE CLINIC | Age: 57
End: 2022-01-03
Payer: COMMERCIAL

## 2022-01-03 VITALS
TEMPERATURE: 97.2 F | WEIGHT: 265.4 LBS | DIASTOLIC BLOOD PRESSURE: 80 MMHG | HEART RATE: 67 BPM | SYSTOLIC BLOOD PRESSURE: 110 MMHG | BODY MASS INDEX: 32.32 KG/M2 | OXYGEN SATURATION: 98 % | HEIGHT: 76 IN

## 2022-01-03 DIAGNOSIS — Z13.1 ENCOUNTER FOR SCREENING EXAMINATION FOR IMPAIRED GLUCOSE REGULATION AND DIABETES MELLITUS: ICD-10-CM

## 2022-01-03 DIAGNOSIS — Z12.5 SCREENING PSA (PROSTATE SPECIFIC ANTIGEN): ICD-10-CM

## 2022-01-03 DIAGNOSIS — Z00.00 WELL ADULT EXAM: Primary | ICD-10-CM

## 2022-01-03 DIAGNOSIS — I10 PRIMARY HYPERTENSION: ICD-10-CM

## 2022-01-03 PROCEDURE — 99396 PREV VISIT EST AGE 40-64: CPT | Performed by: FAMILY MEDICINE

## 2022-01-03 RX ORDER — HYDROCHLOROTHIAZIDE 12.5 MG/1
12.5 CAPSULE, GELATIN COATED ORAL EVERY MORNING
Qty: 90 CAPSULE | Refills: 3 | Status: SHIPPED | OUTPATIENT
Start: 2022-01-03

## 2022-01-03 RX ORDER — SPIRONOLACTONE 100 MG/1
50 TABLET, FILM COATED ORAL DAILY
Qty: 90 TABLET | Refills: 3 | Status: SHIPPED | OUTPATIENT
Start: 2022-01-03

## 2022-01-03 RX ORDER — SPIRONOLACTONE 100 MG/1
100 TABLET, FILM COATED ORAL DAILY
Qty: 90 TABLET | Refills: 3 | Status: SHIPPED | OUTPATIENT
Start: 2022-01-03 | End: 2022-01-03 | Stop reason: DRUGHIGH

## 2022-01-03 RX ORDER — AMLODIPINE BESYLATE 10 MG/1
10 TABLET ORAL DAILY
Qty: 90 TABLET | Refills: 3 | Status: SHIPPED | OUTPATIENT
Start: 2022-01-03

## 2022-01-03 RX ORDER — LISINOPRIL 40 MG/1
40 TABLET ORAL DAILY
Qty: 90 TABLET | Refills: 3 | Status: SHIPPED | OUTPATIENT
Start: 2022-01-03

## 2022-01-03 RX ORDER — METOPROLOL SUCCINATE 100 MG/1
100 TABLET, EXTENDED RELEASE ORAL DAILY
Qty: 30 TABLET | Refills: 5 | Status: SHIPPED | OUTPATIENT
Start: 2022-01-03 | End: 2022-07-11

## 2022-01-03 ASSESSMENT — PATIENT HEALTH QUESTIONNAIRE - PHQ9
SUM OF ALL RESPONSES TO PHQ QUESTIONS 1-9: 0
2. FEELING DOWN, DEPRESSED OR HOPELESS: 0
SUM OF ALL RESPONSES TO PHQ QUESTIONS 1-9: 0
1. LITTLE INTEREST OR PLEASURE IN DOING THINGS: 0
SUM OF ALL RESPONSES TO PHQ QUESTIONS 1-9: 0
SUM OF ALL RESPONSES TO PHQ9 QUESTIONS 1 & 2: 0
SUM OF ALL RESPONSES TO PHQ QUESTIONS 1-9: 0

## 2022-01-03 NOTE — PATIENT INSTRUCTIONS
Make appt for dental exam  Make appt for eye exam    Try decrease the spironolactone to 50mg (1/2tab) daily  Monitor BP and let me know if running high. Monitor blood pressure 2-3 times a week and drop off readings for review if elevated  Goal BP is <140/80 for many people, good control is <130/80    Need a new cuff? Check this website to make sure your BP cuff has been validated for accuracy:    www.validatebp.org              Patient Education        Well Visit, Men 48 to 72: Care Instructions  Overview     Well visits can help you stay healthy. Your doctor has checked your overall health and may have suggested ways to take good care of yourself. Your doctor also may have recommended tests. At home, you can help prevent illness with healthy eating, regular exercise, and other steps. Follow-up care is a key part of your treatment and safety. Be sure to make and go to all appointments, and call your doctor if you are having problems. It's also a good idea to know your test results and keep a list of the medicines you take. How can you care for yourself at home? · Get screening tests that you and your doctor decide on. Screening helps find diseases before any symptoms appear. · Eat healthy foods. Choose fruits, vegetables, whole grains, protein, and low-fat dairy foods. Limit fat, especially saturated fat. Reduce salt in your diet. · Limit alcohol. Have no more than 2 drinks a day or 14 drinks a week. · Get at least 30 minutes of exercise on most days of the week. Walking is a good choice. You also may want to do other activities, such as running, swimming, cycling, or playing tennis or team sports. · Reach and stay at a healthy weight. This will lower your risk for many problems, such as obesity, diabetes, heart disease, and high blood pressure. · Do not smoke. Smoking can make health problems worse. If you need help quitting, talk to your doctor about stop-smoking programs and medicines.  These can increase your chances of quitting for good. · Care for your mental health. It is easy to get weighed down by worry and stress. Learn strategies to manage stress, like deep breathing and mindfulness, and stay connected with your family and community. If you find you often feel sad or hopeless, talk with your doctor. Treatment can help. · Talk to your doctor about whether you have any risk factors for sexually transmitted infections (STIs). You can help prevent STIs if you wait to have sex with a new partner (or partners) until you've each been tested for STIs. It also helps if you use condoms (male or female condoms) and if you limit your sex partners to one person who only has sex with you. Vaccines are available for some STIs. · If it's important to you to prevent pregnancy with your partner, talk with your doctor about birth control options that might be best for you. · If you think you may have a problem with alcohol or drug use, talk to your doctor. This includes prescription medicines (such as amphetamines and opioids) and illegal drugs (such as cocaine and methamphetamine). Your doctor can help you figure out what type of treatment is best for you. · Protect your skin from too much sun. When you're outdoors from 10 a.m. to 4 p.m., stay in the shade or cover up with clothing and a hat with a wide brim. Wear sunglasses that block UV rays. Even when it's cloudy, put broad-spectrum sunscreen (SPF 30 or higher) on any exposed skin. · See a dentist one or two times a year for checkups and to have your teeth cleaned. · Wear a seat belt in the car. When should you call for help? Watch closely for changes in your health, and be sure to contact your doctor if you have any problems or symptoms that concern you. Where can you learn more? Go to https://aviva.health-partners. org and sign in to your [employer] account.  Enter Y634 in the [employer] box to learn more about \"Well Visit, Men 48 to 65: Care Instructions. \"     If you do not have an account, please click on the \"Sign Up Now\" link. Current as of: October 6, 2021               Content Version: 13.1  © 2006-2021 Healthwise, [facility]. Care instructions adapted under license by [facility] ([facility]). If you have questions about a medical condition or this instruction, always ask your healthcare professional. [address] any warranty or liability for your use of this information. Please bring in a copy of your living will and healthcare power of  to put in your chart. Link to forms:   [email or web address]  [email or web address]        Advance Directives and DNR    Decision making at the end of life is difficult for patients, families and health care providers. Since the early 1990s, a number of forms have been developed to help people express their wishes in advance. What are \"advance directives\"? Advance directives are documents that can help you remain in charge of your health care even after you can no longer make decisions for yourself. The two most common forms of written advance directives are the living will and durable power of  for healthcare. Some people seek an s services to complete these documents; however this is not required. You can complete these documents yourself and have them either notarized or witnessed by two people who are over 25 and not related to you by blood or marriage. What is a \"living will\"? A living will is a document that tells your doctor how you want to be treated if when you are determined to be terminally ill or permanently unconscious and you cannot make decisions for yourself.  You can use a living will if you want to avoid life-prolonging treatments such as cardiopulmonary resuscitation (CPR), kidney dialysis or breathing machines. You can use your living will to tell your doctor that you just want to be pain free at the end of our life. In [place], the living will is sometimes called a \"declaration\". A living will form can be obtained from attorneys, [employer], and healthcare facilities. This signed form must be notarized or witnessed. What is a \"durable power of  for healthcare\"? A medical power of  (medical POA) is another type of advance directive that allows you to name a person to make health care decisions for you if and when you become unable to make them for yourself. The person you name to make decisions on your behalf is some times called your health care surrogate, agent, proxy or -in-fact. The person who holds your medical POA can respond to medical situations you might not have anticipated and make decisions for you empowered by knowledge of your values and wishes. The medical POA form can be obtained from attorneys, [employer], and healthcare facilities. This signed form must be notarized or witnessed. The medical POA document is different from the power of  form that authorizes someone to make financial transactions for you. What is cardiopulmonary resuscitation (CPR)? CPR is a technique useful in many emergencies, including heart attack or near drowning, in which someone's breathing or heartbeat has stopped. CPR may include chest compression, mouth-to-mouth or other rescue breathing and/or electric shock. What is a DNR -Comfort Care form (a.k.a. [facility])? DNR means do not resuscitate. A DNR is a medical order given by a physician or other legally authorized prescriber. It addresses the various methods used to revive people whose hearts have stopped beating /or who have stopped breathing. If a person has a [facility] order, he will receive care that eases pain and suffering but no cardio-pulmonary resuscitation (CPR) to save or prolong life.  The [facility] becomes active as soon as it is signed by the doctor or advanced practice nurse. The [facility] is a standard form which can be obtained from the [address] or healthcare facilities. What is DNR Comfort Care - Arrest (a.k.a. [facility])? The [facility] is similar to the [facility] but it only becomes active if and when the person has a cardiac and/or respiratory arrest (i.e. the person stops breathing or his heart stops beating). The [facility] is a standard form which can be obtained from the [address] or healthcare facilities. Patient Education        Eating Healthy Foods: Care Instructions  Your Care Instructions     Eating healthy foods can help lower your risk for disease. Healthy food gives you energy and keeps your heart strong, your brain active, your muscles working, and your bones strong. A healthy diet includes a variety of foods from the basic food groups: grains, vegetables, fruits, milk and milk products, and meat and beans. Some people may eat more of their favorite foods from only one food group and, as a result, miss getting the nutrients they need. So, it is important to pay attention not only to what you eat but also to what you are missing from your diet. You can eat a healthy, balanced diet by making a few small changes. Follow-up care is a key part of your treatment and safety. Be sure to make and go to all appointments, and call your doctor if you are having problems. It's also a good idea to know your test results and keep a list of the medicines you take. How can you care for yourself at home? Look at what you eat  · Keep a food diary for a week or two and record everything you eat or drink. Track the number of servings you eat from each food group. · For a balanced diet every day, eat a variety of:  ? 6 or more ounce-equivalents of grains, such as cereals, breads, crackers, rice, or pasta, every day.  An ounce-equivalent is 1 If your favorite foods are high in fat, salt, and sugar, limit how often you eat them, but do not cut them out entirely. · Eat a wide variety of foods. Make healthy choices when eating out  · The type of restaurant you choose can help you make healthy choices. Even fast-food chains are now offering more low-fat or healthier choices on the menu. · Choose smaller portions, or take half of your meal home. · When eating out, try:  ? A veggie pizza with a whole wheat crust or grilled chicken (instead of sausage or pepperoni). ? Pasta with roasted vegetables, grilled chicken, or marinara sauce instead of cream sauce. ? A vegetable wrap or grilled chicken wrap. ? Broiled or poached food instead of fried or breaded items. Make healthy choices easy  · Buy packaged, prewashed, ready-to-eat fresh vegetables and fruits, such as baby carrots, salad mixes, and chopped or shredded broccoli and cauliflower. · Buy packaged, presliced fruits, such as melon or pineapple. · Choose 100% fruit or vegetable juice instead of soda. Limit juice intake to 4 to 6 oz (½ to ¾ cup) a day. · Blend low-fat yogurt, fruit juice, and canned or frozen fruit to make a smoothie for breakfast or a snack. Where can you learn more? Go to https://SnapflowpelizewSanteen Products.healthID8-Mobile. org and sign in to your [email or web address] account. Enter S032 in the [facility] box to learn more about \"Eating Healthy Foods: Care Instructions. \"     If you do not have an account, please click on the \"Sign Up Now\" link. Current as of: September 8, 2021               Content Version: 13.1  © 2194-4700 Healthwise, Incorporated. Care instructions adapted under license by [facility] ([facility]). If you have questions about a medical condition or this instruction, always ask your healthcare professional. [address] any warranty or liability for your use of this information.

## 2022-01-03 NOTE — PROGRESS NOTES
Here for annual physical.      Dental: aware he is due  Eye: aware he is due    Colonoscopy: up-to-date    Exercise: working - active at work, states needs to do more  Diet: 3 meals daily with unhealthy diet     Living Will: No,   Additional information provided    PHQ Scores 1/3/2022 7/19/2019 3/14/2018   PHQ2 Score 0 0 0   PHQ9 Score 0 0 0     Was vaccinated with Pfizer x 2    Having more knee issues with arthritis, hasn't seen ortho in a while    States has been having breast pain, off and on, hurts to touch, hasn't noted enlargement. Hasn't checked bp in a while as was running very good. SE with meds as above. HM reviewed with pt    Patient's medications, allergies, past medical, surgical, social and family histories were reviewed and updated in the EHR as appropriate. Vitals:    01/03/22 0931   BP: 110/80   Site: Left Upper Arm   Position: Sitting   Cuff Size: Large Adult   Pulse: 67   Temp: 97.2 °F (36.2 °C)   TempSrc: Infrared   SpO2: 98%   Weight: 265 lb 6.4 oz (120.4 kg)   Height: 6' 3.5\" (1.918 m)     Wt Readings from Last 3 Encounters:   01/03/22 265 lb 6.4 oz (120.4 kg)   08/28/19 253 lb (114.8 kg)   07/19/19 250 lb (113.4 kg)     Body mass index is 32.73 kg/m². GENERAL Alert and oriented x 4 NAD, affect appropriate and obese, well hydrated, well developed.   NECK:supple and non tender without mass, no thyromegaly or thyroid nodules, no cervical lymphadenopathy  HEENT: TM clear bilaterally  LUNG:clear to auscultation bilaterally with normal respiratory effort  CV: Normal heart sounds, regular rate and rhythm without murmurs  EXTREMETY: no loss of hair, no edema, normal pedal pulses bilaterally  NEURO: CN grossly intact, moving all extremities equally, no gross deficits          ASSESSMENT AND PLAN:       Elvia Leong was seen today for annual exam.    Diagnoses and all orders for this visit:    Well adult exam  -     Hemoglobin A1C; Future  Recommended screenings discussed and ordered if patient agreed  Recommended vaccinations discussed and ordered if patient agreed  Encouraged healthy diet   Encouraged regular exercise and maintaining a healthy weight  Discussed living will/healthcare POA and encouraged to get if doesn't have. Primary hypertension  -     Lipid Panel; Future  -     Comprehensive Metabolic Panel; Future  -Stable, continue current medications. Screening PSA (prostate specific antigen)  -     PSA screening; Future    Encounter for screening examination for impaired glucose regulation and diabetes mellitus  -     Hemoglobin A1C; Future    Other orders  -     amLODIPine (NORVASC) 10 MG tablet; Take 1 tablet by mouth daily  -     hydroCHLOROthiazide (MICROZIDE) 12.5 MG capsule; Take 1 capsule by mouth every morning  -     metoprolol succinate (TOPROL XL) 100 MG extended release tablet; Take 1 tablet by mouth daily  -     Discontinue: spironolactone (ALDACTONE) 100 MG tablet; Take 1 tablet by mouth daily  -     lisinopril (PRINIVIL;ZESTRIL) 40 MG tablet; Take 1 tablet by mouth daily  -     spironolactone (ALDACTONE) 100 MG tablet; Take 0.5 tablets by mouth daily            Return in about 1 year (around 1/3/2023) for Well, 30 min.          Portions of Note per  James Levin CMA AAMA with corrections and edits per Vladimir Gonzales MD.  I agree with entirety of note and was present and performed history and physical.  I also confirm that the note above accurately reflects all work, treatment, procedures, and medical decision making performed by me, Vladimir Gonzales MD

## 2022-01-04 LAB
A/G RATIO: 1.4 (CALC) (ref 1–2.5)
ALBUMIN SERPL-MCNC: 4.5 G/DL (ref 3.6–5.1)
ALP BLD-CCNC: 57 U/L (ref 35–144)
ALT SERPL-CCNC: 27 U/L (ref 9–46)
AST SERPL-CCNC: 18 U/L (ref 10–35)
BILIRUB SERPL-MCNC: 0.7 MG/DL (ref 0.2–1.2)
BUN / CREAT RATIO: 25 (CALC) (ref 6–22)
BUN BLDV-MCNC: 32 MG/DL (ref 7–25)
CALCIUM SERPL-MCNC: 10 MG/DL (ref 8.6–10.3)
CHLORIDE BLD-SCNC: 102 MMOL/L (ref 98–110)
CHOLESTEROL, TOTAL: 160 MG/DL
CHOLESTEROL/HDL RATIO: 3.7 (CALC)
CO2: 27 MMOL/L (ref 20–32)
CREAT SERPL-MCNC: 1.27 MG/DL (ref 0.7–1.33)
GFR AFRICAN AMERICAN: 72 ML/MIN/1.73M2
GFR NON-AFRICAN AMERICAN: 62 ML/MIN/1.73M2
GLOBULIN: 3.2 G/DL (CALC) (ref 1.9–3.7)
GLUCOSE BLD-MCNC: 105 MG/DL (ref 65–99)
HBA1C MFR BLD: 5.4 % OF TOTAL HGB
HDLC SERPL-MCNC: 43 MG/DL
LDL CHOLESTEROL CALCULATED: 89 MG/DL (CALC)
NONHDLC SERPL-MCNC: 117 MG/DL (CALC)
POTASSIUM SERPL-SCNC: 5.2 MMOL/L (ref 3.5–5.3)
PROSTATE SPECIFIC ANTIGEN: 0.31 NG/ML
SODIUM BLD-SCNC: 135 MMOL/L (ref 135–146)
TOTAL PROTEIN: 7.7 G/DL (ref 6.1–8.1)
TRIGL SERPL-MCNC: 182 MG/DL

## 2022-07-11 RX ORDER — METOPROLOL SUCCINATE 100 MG/1
TABLET, EXTENDED RELEASE ORAL
Qty: 90 TABLET | Refills: 1 | Status: SHIPPED | OUTPATIENT
Start: 2022-07-11

## 2022-07-11 NOTE — TELEPHONE ENCOUNTER
Medication:   Requested Prescriptions     Pending Prescriptions Disp Refills    metoprolol succinate (TOPROL XL) 100 MG extended release tablet [Pharmacy Med Name: METOPROLOL SUCC  MG TAB] 90 tablet 1     Sig: TAKE 1 TABLET BY MOUTH EVERY DAY          Patient Phone Number: [phone] (home)     Last appt: 1/3/2022   Next appt: Visit date not found    Last OARRS: No flowsheet data found.   PDMP Monitoring:    Last PDMP [facility] as Reviewed [facility]):  Review User Review Instant Review Result          Preferred Pharmacy:   [facility] [address], [address] [place] [address] Molina Garcia  Phone: [phone] Fax: [phone]    [facility] Sush[address] Garcia, [place] [phone] -  [phone]  [facility] [address]  Phone: [phone] Fax: [phone]

## 2023-01-03 RX ORDER — METOPROLOL SUCCINATE 100 MG/1
100 TABLET, EXTENDED RELEASE ORAL DAILY
Qty: 30 TABLET | Refills: 0 | Status: SHIPPED | OUTPATIENT
Start: 2023-01-03

## 2023-01-03 RX ORDER — AMLODIPINE BESYLATE 10 MG/1
10 TABLET ORAL DAILY
Qty: 30 TABLET | Refills: 0 | Status: SHIPPED | OUTPATIENT
Start: 2023-01-03

## 2023-01-03 RX ORDER — HYDROCHLOROTHIAZIDE 12.5 MG/1
12.5 CAPSULE, GELATIN COATED ORAL EVERY MORNING
Qty: 30 CAPSULE | Refills: 0 | Status: SHIPPED | OUTPATIENT
Start: 2023-01-03

## 2023-01-03 RX ORDER — SPIRONOLACTONE 100 MG/1
100 TABLET, FILM COATED ORAL DAILY
Qty: 30 TABLET | Refills: 0 | Status: SHIPPED | OUTPATIENT
Start: 2023-01-03

## 2023-01-03 NOTE — TELEPHONE ENCOUNTER
Medication:   Requested Prescriptions     Pending Prescriptions Disp Refills    metoprolol succinate (TOPROL XL) 100 MG extended release tablet [Pharmacy Med Name: METOPROLOL SUCC  MG TAB] 90 tablet 1     Sig: TAKE 1 TABLET BY MOUTH EVERY DAY    hydroCHLOROthiazide (MICROZIDE) 12.5 MG capsule [Pharmacy Med Name: HYDROCHLOROTHIAZIDE 12.5 MG CP] 90 capsule 3     Sig: TAKE 1 CAPSULE BY MOUTH EVERY DAY IN THE MORNING    amLODIPine (NORVASC) 10 MG tablet [Pharmacy Med Name: AMLODIPINE BESYLATE 10 MG TAB] 90 tablet 3     Sig: TAKE 1 TABLET BY MOUTH EVERY DAY    spironolactone (ALDACTONE) 100 MG tablet [Pharmacy Med Name: SPIRONOLACTONE 100 MG TABLET] 90 tablet 3     Sig: TAKE 1 TABLET BY MOUTH EVERY DAY          Patient Phone Number: [phone] (home)     Last appt: 1/3/2022   Next appt: Visit date not found    Last OARRS: No flowsheet data found.   PDMP Monitoring:    Last PDMP Shlomo crawford Reviewed [facility]):  Review User Review Instant Review Result          Preferred Pharmacy:   [facility] [address], [address] [place] [address]  Phone: [phone] Fax: [phone]    [facility] Sushil , [address] [phone] - F [phone]  [facility] [address]  Phone: [phone] Fax: [phone]

## 2023-01-26 RX ORDER — METOPROLOL SUCCINATE 100 MG/1
100 TABLET, EXTENDED RELEASE ORAL DAILY
Qty: 30 TABLET | Refills: 0 | OUTPATIENT
Start: 2023-01-26

## 2023-01-26 NOTE — TELEPHONE ENCOUNTER
Medication:   Requested Prescriptions     Pending Prescriptions Disp Refills    metoprolol succinate (TOPROL XL) 100 MG extended release tablet [Pharmacy Med Name: METOPROLOL SUCC  MG TAB] 30 tablet 0     Sig: TAKE 1 TABLET BY MOUTH DAILY NEEDS APPT BEFORE ANY MORE REFILLS! Patient Phone Number: [phone] (home)     Last appt: 1/3/2022   Next appt: Visit date not found    Last OARRS: No flowsheet data found.   PDMP Monitoring:    Last PDMP Wayne Gama as Reviewed [facility]):  Review User Review Instant Review Result          Preferred Pharmacy:   [facility] [address], [address] [place] [address] Molina Garcia  Phone: [phone] Fax: [phone]    [facility] Sush[address] Garcia, [place] [phone] - F [phone]  [place] [address]  Phone: [phone] Fax: [phone]

## 2023-02-20 RX ORDER — HYDROCHLOROTHIAZIDE 12.5 MG/1
12.5 CAPSULE, GELATIN COATED ORAL EVERY MORNING
Qty: 30 CAPSULE | Refills: 0 | OUTPATIENT
Start: 2023-02-20

## 2023-03-01 ENCOUNTER — PATIENT MESSAGE (OUTPATIENT)
Dept: FAMILY MEDICINE CLINIC | Age: 58
End: 2023-03-01

## 2023-03-01 RX ORDER — HYDROCHLOROTHIAZIDE 12.5 MG/1
12.5 CAPSULE, GELATIN COATED ORAL EVERY MORNING
Qty: 30 CAPSULE | Refills: 0 | Status: CANCELLED | OUTPATIENT
Start: 2023-03-01

## 2023-03-01 NOTE — TELEPHONE ENCOUNTER
Hi good afternoon, Dr. Christopher Irizarry is off today but  will find out about what lab work is needed for you tomorrow. Also in regards to your low back pain, how about we schedule an appointment to be evaluated?

## 2023-03-01 NOTE — TELEPHONE ENCOUNTER
Hi good afternoon, Dr. Taurus Cash is off today but  will find out about what lab work is needed for you tomorrow. Also in regards to your low back pain, how about we schedule an appointment to be evaluated?

## 2023-03-01 NOTE — TELEPHONE ENCOUNTER
From: Bryanna Ortega  To: Dr. Estela Tadeo: 3/1/2023 11:48 AM EST  Subject: Lab work    I asking for lab work orders to be sent to [facility] [employer]. I know Im probably due for lab work, but my lower left back has been bothering greatly. So Im wondering if kidney pain.  Thanks

## 2023-03-02 ENCOUNTER — OFFICE VISIT (OUTPATIENT)
Dept: FAMILY MEDICINE CLINIC | Age: 58
End: 2023-03-02
Payer: COMMERCIAL

## 2023-03-02 VITALS
HEIGHT: 76 IN | SYSTOLIC BLOOD PRESSURE: 104 MMHG | BODY MASS INDEX: 33.12 KG/M2 | DIASTOLIC BLOOD PRESSURE: 76 MMHG | HEART RATE: 62 BPM | WEIGHT: 272 LBS | OXYGEN SATURATION: 98 % | TEMPERATURE: 97.9 F

## 2023-03-02 DIAGNOSIS — R73.9 HYPERGLYCEMIA: ICD-10-CM

## 2023-03-02 DIAGNOSIS — Z12.5 SCREENING PSA (PROSTATE SPECIFIC ANTIGEN): ICD-10-CM

## 2023-03-02 DIAGNOSIS — I10 PRIMARY HYPERTENSION: Primary | ICD-10-CM

## 2023-03-02 DIAGNOSIS — S39.012A STRAIN OF LUMBAR REGION, INITIAL ENCOUNTER: ICD-10-CM

## 2023-03-02 PROCEDURE — 3074F SYST BP LT 130 MM HG: CPT | Performed by: FAMILY MEDICINE

## 2023-03-02 PROCEDURE — 99214 OFFICE O/P EST MOD 30 MIN: CPT | Performed by: FAMILY MEDICINE

## 2023-03-02 PROCEDURE — 3078F DIAST BP <80 MM HG: CPT | Performed by: FAMILY MEDICINE

## 2023-03-02 RX ORDER — LISINOPRIL 40 MG/1
40 TABLET ORAL DAILY
Qty: 90 TABLET | Refills: 3 | Status: SHIPPED | OUTPATIENT
Start: 2023-03-02

## 2023-03-02 RX ORDER — AMLODIPINE BESYLATE 10 MG/1
10 TABLET ORAL DAILY
Qty: 90 TABLET | Refills: 3 | Status: SHIPPED | OUTPATIENT
Start: 2023-03-02

## 2023-03-02 RX ORDER — METOPROLOL SUCCINATE 100 MG/1
100 TABLET, EXTENDED RELEASE ORAL DAILY
Qty: 90 TABLET | Refills: 3 | Status: SHIPPED | OUTPATIENT
Start: 2023-03-02

## 2023-03-02 RX ORDER — HYDROCHLOROTHIAZIDE 12.5 MG/1
12.5 CAPSULE, GELATIN COATED ORAL EVERY MORNING
Qty: 90 CAPSULE | Refills: 3 | Status: SHIPPED | OUTPATIENT
Start: 2023-03-02

## 2023-03-02 RX ORDER — SPIRONOLACTONE 100 MG/1
100 TABLET, FILM COATED ORAL DAILY
Qty: 90 TABLET | Refills: 3 | Status: SHIPPED | OUTPATIENT
Start: 2023-03-02

## 2023-03-02 SDOH — ECONOMIC STABILITY: HOUSING INSECURITY
IN THE LAST 12 MONTHS, WAS THERE A TIME WHEN YOU DID NOT HAVE A STEADY PLACE TO SLEEP OR SLEPT IN A SHELTER (INCLUDING NOW)?: NO

## 2023-03-02 SDOH — ECONOMIC STABILITY: FOOD INSECURITY: WITHIN THE PAST 12 MONTHS, THE FOOD YOU BOUGHT JUST DIDN'T LAST AND YOU DIDN'T HAVE MONEY TO GET MORE.: NEVER TRUE

## 2023-03-02 SDOH — ECONOMIC STABILITY: FOOD INSECURITY: WITHIN THE PAST 12 MONTHS, YOU WORRIED THAT YOUR FOOD WOULD RUN OUT BEFORE YOU GOT MONEY TO BUY MORE.: NEVER TRUE

## 2023-03-02 SDOH — ECONOMIC STABILITY: INCOME INSECURITY: HOW HARD IS IT FOR YOU TO PAY FOR THE VERY BASICS LIKE FOOD, HOUSING, MEDICAL CARE, AND HEATING?: NOT HARD AT ALL

## 2023-03-02 ASSESSMENT — PATIENT HEALTH QUESTIONNAIRE - PHQ9
SUM OF ALL RESPONSES TO PHQ9 QUESTIONS 1 & 2: 0
SUM OF ALL RESPONSES TO PHQ QUESTIONS 1-9: 0
2. FEELING DOWN, DEPRESSED OR HOPELESS: 0
SUM OF ALL RESPONSES TO PHQ QUESTIONS 1-9: 0
SUM OF ALL RESPONSES TO PHQ QUESTIONS 1-9: 0
1. LITTLE INTEREST OR PLEASURE IN DOING THINGS: 0
SUM OF ALL RESPONSES TO PHQ QUESTIONS 1-9: 0

## 2023-03-02 NOTE — PROGRESS NOTES
Patient is here complaining of back pain that started 2-3 week(s) ago. Also here to f/u with HTN. Job is very active. Initial inciting event: none, low back. Has been better at night but bothers him, hard and pain to roll over, radiates to left side/abdomen. Doesn't bother at work. If sits for a while gets stiff and then hard to get up. Pain is described as: aching  Pain down legs: No  Numbness or Tingling: No  Worse with:  rolling over in bed on right side  Better with:  moving around  Bowel changes: No  Bladder changes: No    Patient has tried NSAID: Ibuprofen to help with pain with moderate. States when doesn't drink as much diet coke feels better. Checks BP at home: Yes - occasionally  BP numbers: runs ok  Taking medication daily: Yes (lisinopril, amlodipine, HCTZ, metoprolol, and spironolactone )  Side Effects of medication: no      Vitals:    03/02/23 0900   BP: 104/76   Site: Left Upper Arm   Position: Sitting   Cuff Size: Large Adult   Pulse: 62   Temp: 97.9 °F (36.6 °C)   TempSrc: Infrared   SpO2: 98%   Weight: 272 lb (123.4 kg)   Height: 6' 4\" (1.93 m)     Wt Readings from Last 3 Encounters:   03/02/23 272 lb (123.4 kg)   01/03/22 265 lb 6.4 oz (120.4 kg)   08/28/19 253 lb (114.8 kg)     Body mass index is 33.11 kg/m². No data recorded      GEN: Alert and oriented x 4 NAD, affect appropriate and obese, well hydrated, well developed. Lungs CTAB  CV rrr      lumbar spine    Flexion: normal with pain. mild,   Extension: normal with pain. mild,   Rotation:  normal without pain  Lateral Bending:  normal without pain. Palpation: spinous processes are non-tender on palpation, paraspinous muscles are non-tender on palpation,             ASSESSMENT AND PLAN:       María Morrison was seen today for medication refill and back pain. Diagnoses and all orders for this visit:    Primary hypertension  -     Comprehensive Metabolic Panel; Future  -     Lipid Panel;  Future  -Stable, continue current medications. (Labs ordered contributed to MDM)    Hyperglycemia  -     Hemoglobin A1C; Future  -Encourage low carb diet, watching calories, regular exercise and weight loss  (Labs ordered contributed to MDM)    Screening PSA (prostate specific antigen)  -     PSA Screening; Future    Strain of lumbar region, initial encounter  Stretches given  Sx tx  If not improving consider PT    Other orders  -     metoprolol succinate (TOPROL XL) 100 MG extended release tablet; Take 1 tablet by mouth daily  -     hydroCHLOROthiazide (MICROZIDE) 12.5 MG capsule; Take 1 capsule by mouth every morning  -     amLODIPine (NORVASC) 10 MG tablet; Take 1 tablet by mouth daily  -     spironolactone (ALDACTONE) 100 MG tablet; Take 1 tablet by mouth daily  -     lisinopril (PRINIVIL;ZESTRIL) 40 MG tablet; Take 1 tablet by mouth daily          Return in about 1 year (around 3/2/2024) for Well, 30 min.          Portions of Note per  THERESA Ernandez with corrections and edits per Rajesh Palomino MD.  I agree with entirety of note and was present and performed history and physical.  I also confirm that the note above accurately reflects all work, treatment, procedures, and medical decision making performed by me, Rajesh Palomino MD

## 2023-03-04 LAB
A/G RATIO: 1.6 (CALC) (ref 1–2.5)
ALBUMIN SERPL-MCNC: 4.3 G/DL (ref 3.6–5.1)
ALP BLD-CCNC: 55 U/L (ref 35–144)
ALT SERPL-CCNC: 25 U/L (ref 9–46)
AST SERPL-CCNC: 17 U/L (ref 10–35)
BILIRUB SERPL-MCNC: 0.7 MG/DL (ref 0.2–1.2)
BUN / CREAT RATIO: 32 (CALC) (ref 6–22)
BUN BLDV-MCNC: 38 MG/DL (ref 7–25)
CALCIUM SERPL-MCNC: 9.5 MG/DL (ref 8.6–10.3)
CHLORIDE BLD-SCNC: 102 MMOL/L (ref 98–110)
CHOLESTEROL, TOTAL: 145 MG/DL
CHOLESTEROL/HDL RATIO: 4 (CALC)
CO2: 26 MMOL/L (ref 20–32)
CREAT SERPL-MCNC: 1.2 MG/DL (ref 0.7–1.3)
ESTIMATED GLOMERULAR FILTRATION RATE CREATININE EQUATION: 70 ML/MIN/1.73M2
GLOBULIN: 2.7 G/DL (CALC) (ref 1.9–3.7)
GLUCOSE BLD-MCNC: 98 MG/DL (ref 65–99)
HBA1C MFR BLD: 5.6 % OF TOTAL HGB
HDLC SERPL-MCNC: 36 MG/DL
LDL CHOLESTEROL CALCULATED: 82 MG/DL (CALC)
NONHDLC SERPL-MCNC: 109 MG/DL (CALC)
POTASSIUM SERPL-SCNC: 5.5 MMOL/L (ref 3.5–5.3)
PROSTATE SPECIFIC ANTIGEN: 0.36 NG/ML
SODIUM BLD-SCNC: 135 MMOL/L (ref 135–146)
TOTAL PROTEIN: 7 G/DL (ref 6.1–8.1)
TRIGL SERPL-MCNC: 171 MG/DL

## 2023-03-06 ENCOUNTER — TELEPHONE (OUTPATIENT)
Dept: FAMILY MEDICINE CLINIC | Age: 58
End: 2023-03-06

## 2023-03-06 DIAGNOSIS — E87.5 HIGH SERUM POTASSIUM LEVEL: Primary | ICD-10-CM

## 2023-03-06 NOTE — TELEPHONE ENCOUNTER
----- Message from Kiet Virgen MD sent at 3/6/2023  8:17 AM EST -----  Labs ok except K high - recheck in 2 weeks BMP

## 2024-02-15 LAB
ALBUMIN SERPL-MCNC: 4.4 G/DL
ALP BLD-CCNC: 51 U/L
ALT SERPL-CCNC: 31 U/L
AST SERPL-CCNC: 17 U/L
BILIRUB SERPL-MCNC: 0.8 MG/DL (ref 0.1–1.4)
BUN BLDV-MCNC: 32 MG/DL (ref 7–25)
CALCIUM SERPL-MCNC: 9.4 MG/DL
CHLORIDE BLD-SCNC: 103 MMOL/L
CHOLESTEROL, TOTAL: 164 MG/DL
CO2: 27 MMOL/L
CREAT SERPL-MCNC: 1.2 MG/DL (ref 0.8–1.2)
EGFR: 75
GLUCOSE BLD-MCNC: 105 MG/DL (ref 74–99)
HBA1C MFR BLD: 5.6 % (ref ?–5.6)
HDLC SERPL-MCNC: 44 MG/DL (ref 40–70)
LDL CHOLESTEROL CALCULATED: 98 MG/DL (ref 0–160)
Lab: 114
POTASSIUM SERPL-SCNC: 4.8 MMOL/L
PROSTATE SPECIFIC ANTIGEN: 0.7 NG/ML (ref ?–4)
SODIUM BLD-SCNC: 138 MMOL/L
TOTAL PROTEIN: 7.3
TRIGL SERPL-MCNC: 109 MG/DL
VLDLC SERPL CALC-MCNC: NORMAL MG/DL

## 2024-02-28 RX ORDER — HYDROCHLOROTHIAZIDE 12.5 MG/1
12.5 CAPSULE, GELATIN COATED ORAL EVERY MORNING
Qty: 30 CAPSULE | Refills: 17 | Status: SHIPPED | OUTPATIENT
Start: 2024-02-28

## 2024-02-28 NOTE — TELEPHONE ENCOUNTER
Medication:   Requested Prescriptions     Pending Prescriptions Disp Refills    hydroCHLOROthiazide 12.5 MG capsule [Pharmacy Med Name: HYDROCHLOROTHIAZIDE 12.5 MG CP] 30 capsule 17     Sig: TAKE 1 CAPSULE BY MOUTH EVERY DAY IN THE MORNING      Provider out of office.     Patient Phone Number: [phone] (home)     Last appt: 3/2/2023   Next appt: 3/8/2024    Last OARRS:        No data to display              PDMP Monitoring:    Last PDMP Arthur as Reviewed ([place]):  Review User Review Instant Review Result          Preferred Pharmacy:   [id number] [place] - [address] [facility] -  [phone] - F [phone]  [address] [facility] [place] [address]  Phone: [phone] Fax: [phone]    [facility] - [place], [place] - [address] [place] [address] - P [phone] - F [phone]  [address] [facility] [address]  Phone: [phone] Fax: [phone]    [id number] [place], [place] - [address] -  [phone] - F [phone]  [address]  [place] [address]  Phone: [phone] Fax: [phone]

## 2024-03-05 ASSESSMENT — PATIENT HEALTH QUESTIONNAIRE - PHQ9
SUM OF ALL RESPONSES TO PHQ9 QUESTIONS 1 & 2: 0
1. LITTLE INTEREST OR PLEASURE IN DOING THINGS: 0
2. FEELING DOWN, DEPRESSED OR HOPELESS: NOT AT ALL
2. FEELING DOWN, DEPRESSED OR HOPELESS: 0
SUM OF ALL RESPONSES TO PHQ QUESTIONS 1-9: 0
SUM OF ALL RESPONSES TO PHQ9 QUESTIONS 1 & 2: 0
SUM OF ALL RESPONSES TO PHQ QUESTIONS 1-9: 0
SUM OF ALL RESPONSES TO PHQ QUESTIONS 1-9: 0
1. LITTLE INTEREST OR PLEASURE IN DOING THINGS: NOT AT ALL
SUM OF ALL RESPONSES TO PHQ QUESTIONS 1-9: 0

## 2024-03-06 NOTE — PROGRESS NOTES
Here for annual physical.  No concerns    Dental: Been over a year  Eye: Been over a year    Colonoscopy: up-to-date      Seatbelt: Always    Exercise:   once a month , treadmill.  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 Yes    Living Will and/or Healthcare POA: No,   Additional information provided        3/5/2024     7:11 PM 3/2/2023     9:00 AM 1/3/2022     9:46 AM 7/19/2019     4:08 PM 3/14/2018    10:32 AM   PHQ Scores   PHQ2 Score 0 0 0 0 0   PHQ9 Score 0 0 0 0 0        Rayshawn Maloney is a 58 y.o. male who presents for follow-up of HTN.  Checks BP at home: Yes  BP numbers: \"normal and really good\"  Taking medication daily: Yes (lisinopril, amlodipine, HCTZ, metoprolol, and spironolactone )  Side Effects of medication: no      Concerned about testosterone, two of his sons had low numbers. Reports some fatigue. No ED sx.       HM reviewed with pt    Patient's medications, allergies, past medical, surgical, social and family histories were reviewed and updated in the EHR as appropriate.    Vitals:    03/08/24 0841   BP: 96/72   Site: Left Upper Arm   Position: Sitting   Cuff Size: Large Adult   Pulse: 61   Temp: 97.1 °F (36.2 °C)   TempSrc: Infrared   SpO2: 96%   Weight: 124 kg (273 lb 6.4 oz)   Height: 1.93 m (6' 4\")     Wt Readings from Last 3 Encounters:   03/08/24 124 kg (273 lb 6.4 oz)   03/02/23 123.4 kg (272 lb)   01/03/22 120.4 kg (265 lb 6.4 oz)     Body mass index is 33.28 kg/m².      GENERAL Alert and oriented x 4 NAD, affect appropriate and obese, well hydrated, well developed.  NECK:supple and non tender without mass, no thyromegaly or thyroid nodules, no cervical lymphadenopathy  HEENT: TM clear bilaterally  LUNG:clear to auscultation bilaterally with normal respiratory effort  CV: Normal heart sounds, regular rate and rhythm without murmurs  EXTREMETY: no loss of hair, no edema, normal pedal pulses bilaterally  NEURO: CN grossly intact,

## 2024-03-08 ENCOUNTER — OFFICE VISIT (OUTPATIENT)
Dept: FAMILY MEDICINE CLINIC | Age: 59
End: 2024-03-08

## 2024-03-08 VITALS
HEIGHT: 76 IN | OXYGEN SATURATION: 96 % | WEIGHT: 273.4 LBS | HEART RATE: 61 BPM | BODY MASS INDEX: 33.29 KG/M2 | DIASTOLIC BLOOD PRESSURE: 68 MMHG | SYSTOLIC BLOOD PRESSURE: 96 MMHG | TEMPERATURE: 97.1 F

## 2024-03-08 DIAGNOSIS — R73.02 IGT (IMPAIRED GLUCOSE TOLERANCE): ICD-10-CM

## 2024-03-08 DIAGNOSIS — Z00.00 WELL ADULT EXAM: Primary | ICD-10-CM

## 2024-03-08 DIAGNOSIS — I10 PRIMARY HYPERTENSION: ICD-10-CM

## 2024-03-08 RX ORDER — HYDROCHLOROTHIAZIDE 12.5 MG/1
12.5 CAPSULE, GELATIN COATED ORAL EVERY MORNING
Qty: 90 CAPSULE | Refills: 3 | Status: SHIPPED | OUTPATIENT
Start: 2024-03-08

## 2024-03-08 RX ORDER — AMLODIPINE BESYLATE 10 MG/1
10 TABLET ORAL DAILY
Qty: 90 TABLET | Refills: 3 | Status: SHIPPED | OUTPATIENT
Start: 2024-03-08

## 2024-03-08 RX ORDER — LISINOPRIL 40 MG/1
40 TABLET ORAL DAILY
Qty: 90 TABLET | Refills: 3 | Status: SHIPPED | OUTPATIENT
Start: 2024-03-08

## 2024-03-08 RX ORDER — SPIRONOLACTONE 100 MG/1
100 TABLET, FILM COATED ORAL DAILY
Qty: 90 TABLET | Refills: 3 | Status: SHIPPED | OUTPATIENT
Start: 2024-03-08

## 2024-03-08 RX ORDER — METOPROLOL SUCCINATE 100 MG/1
100 TABLET, EXTENDED RELEASE ORAL DAILY
Qty: 90 TABLET | Refills: 3 | Status: SHIPPED | OUTPATIENT
Start: 2024-03-08

## 2024-03-08 SDOH — ECONOMIC STABILITY: FOOD INSECURITY: WITHIN THE PAST 12 MONTHS, THE FOOD YOU BOUGHT JUST DIDN'T LAST AND YOU DIDN'T HAVE MONEY TO GET MORE.: NEVER TRUE

## 2024-03-08 SDOH — ECONOMIC STABILITY: INCOME INSECURITY: HOW HARD IS IT FOR YOU TO PAY FOR THE VERY BASICS LIKE FOOD, HOUSING, MEDICAL CARE, AND HEATING?: NOT HARD AT ALL

## 2024-03-08 SDOH — ECONOMIC STABILITY: FOOD INSECURITY: WITHIN THE PAST 12 MONTHS, YOU WORRIED THAT YOUR FOOD WOULD RUN OUT BEFORE YOU GOT MONEY TO BUY MORE.: NEVER TRUE

## 2024-03-08 NOTE — PATIENT INSTRUCTIONS
--Check with pharmacy about getting the shingles vaccine, Shingrix (not Zostavax)      --Check with your pharmacy about getting the RSV vaccine this fall     --Get your Covid vaccine

## 2024-07-08 ENCOUNTER — OFFICE VISIT (OUTPATIENT)
Dept: FAMILY MEDICINE CLINIC | Age: 59
End: 2024-07-08
Payer: COMMERCIAL

## 2024-07-08 VITALS — TEMPERATURE: 98.5 F | HEART RATE: 96 BPM | OXYGEN SATURATION: 98 %

## 2024-07-08 DIAGNOSIS — B96.89 ACUTE BACTERIAL SINUSITIS: Primary | ICD-10-CM

## 2024-07-08 DIAGNOSIS — J01.90 ACUTE BACTERIAL SINUSITIS: Primary | ICD-10-CM

## 2024-07-08 DIAGNOSIS — J40 BRONCHITIS: ICD-10-CM

## 2024-07-08 PROCEDURE — 99213 OFFICE O/P EST LOW 20 MIN: CPT | Performed by: STUDENT IN AN ORGANIZED HEALTH CARE EDUCATION/TRAINING PROGRAM

## 2024-07-08 RX ORDER — DOXYCYCLINE HYCLATE 100 MG
100 TABLET ORAL 2 TIMES DAILY
Qty: 10 TABLET | Refills: 0 | Status: SHIPPED | OUTPATIENT
Start: 2024-07-08 | End: 2024-07-13

## 2024-07-08 NOTE — PROGRESS NOTES
Ears: TMs normal bilaterally. Nose: normal, moderate nasal congestion. Throat: Oropharynx normal.    NECK: Supple, no LAD  RESPIRATORY: Normal respiratory effort, lungs CTAB no crackles, wheezes, or rhonchi  HEART: Regular rate and rhythm, no murmurs  SKIN: Skin color, texture, and turgor normal. No rash.    ASSESSMENT & PLAN:     59 y.o. male presents to the office for a sick visit.    1. Acute bacterial sinusitis  2. Bronchitis  - Symptoms consistent with acute sinusitis; lingering cough likely bronchitis. Treat with doxycycline. Continue supportive care.  - Recommend alternating tylenol/ibuprofen as needed. Gargle warm salt water to help with sore throat. Cough drops and/or honey as helpful for cough. Push fluids. Recommend Claritin or Zyrtec and Flonase to help with congestion and fluid behind ears.    - doxycycline hyclate (VIBRA-TABS) 100 MG tablet; Take 1 tablet by mouth 2 times daily for 5 days  Dispense: 10 tablet; Refill: 0        If the patient has a future appointment, it is displayed below:  No future appointments.        Electronically signed by Cherie Jara DO on 7/8/2024 at 5:03 PM

## 2024-07-15 RX ORDER — LISINOPRIL 40 MG/1
TABLET ORAL
Qty: 90 TABLET | Refills: 3 | Status: SHIPPED | OUTPATIENT
Start: 2024-07-15

## 2024-07-15 NOTE — TELEPHONE ENCOUNTER
Medication:   Requested Prescriptions     Pending Prescriptions Disp Refills    lisinopril (PRINIVIL;ZESTRIL) 40 MG tablet [Pharmacy Med Name: LISINOPRIL 40 MG TABLET] 90 tablet 3     Sig: TAKE 1 TABLET BY MOUTH EVERY DAY~NEED INS INFO          Patient Phone Number: [phone] (home)     Last appt: 7/8/2024   Next appt: Visit date not found    Last OARRS:        No data to display              PDMP Monitoring:    Last PDMP [place] as Reviewed ([place]):  Review User Review Instant Review Result          Preferred Pharmacy:   [id number] - [facility], [place] - [address] [phone] - F [phone]  [address] [place] [address]  Phone: [phone] Fax: [phone]    [facility] - [place], [place] - [address] - P [phone] - F [phone]  [address] [address]  Phone: [phone] Fax: [phone]    [id number] [place], [place] - [address] -  [phone] - F [phone]  [address]  [place] [address]  Phone: [phone] Fax: [phone]

## 2025-03-05 RX ORDER — METOPROLOL SUCCINATE 100 MG/1
TABLET, EXTENDED RELEASE ORAL
Qty: 90 TABLET | Refills: 0 | Status: SHIPPED | OUTPATIENT
Start: 2025-03-05

## 2025-03-05 RX ORDER — HYDROCHLOROTHIAZIDE 12.5 MG/1
CAPSULE ORAL
Qty: 90 CAPSULE | Refills: 0 | Status: SHIPPED | OUTPATIENT
Start: 2025-03-05

## 2025-03-14 RX ORDER — AMLODIPINE BESYLATE 10 MG/1
TABLET ORAL
Qty: 30 TABLET | Refills: 0 | Status: SHIPPED | OUTPATIENT
Start: 2025-03-14

## 2025-03-26 ENCOUNTER — OFFICE VISIT (OUTPATIENT)
Dept: FAMILY MEDICINE CLINIC | Age: 60
End: 2025-03-26
Payer: COMMERCIAL

## 2025-03-26 ENCOUNTER — HOSPITAL ENCOUNTER (OUTPATIENT)
Age: 60
Discharge: HOME | End: 2025-03-26
Payer: COMMERCIAL

## 2025-03-26 VITALS
WEIGHT: 272.4 LBS | SYSTOLIC BLOOD PRESSURE: 100 MMHG | TEMPERATURE: 97 F | OXYGEN SATURATION: 99 % | BODY MASS INDEX: 33.87 KG/M2 | DIASTOLIC BLOOD PRESSURE: 72 MMHG | HEART RATE: 67 BPM | HEIGHT: 75 IN

## 2025-03-26 DIAGNOSIS — Z12.5 SCREENING PSA (PROSTATE SPECIFIC ANTIGEN): ICD-10-CM

## 2025-03-26 DIAGNOSIS — M25.552 LEFT HIP PAIN: ICD-10-CM

## 2025-03-26 DIAGNOSIS — R73.02 IGT (IMPAIRED GLUCOSE TOLERANCE): ICD-10-CM

## 2025-03-26 DIAGNOSIS — M79.675 PAIN OF TOE OF LEFT FOOT: ICD-10-CM

## 2025-03-26 DIAGNOSIS — I10 PRIMARY HYPERTENSION: ICD-10-CM

## 2025-03-26 DIAGNOSIS — M54.31 SCIATICA, RIGHT SIDE: ICD-10-CM

## 2025-03-26 DIAGNOSIS — R53.83 OTHER FATIGUE: ICD-10-CM

## 2025-03-26 DIAGNOSIS — Z00.00 WELL ADULT EXAM: Primary | ICD-10-CM

## 2025-03-26 LAB
ALBUMIN SERPL-MCNC: 4.3 G/DL (ref 3.4–5)
ALBUMIN/GLOB SERPL: 1.4 {RATIO} (ref 1.1–2.2)
ALP SERPL-CCNC: 65 U/L (ref 40–129)
ALT SERPL-CCNC: 36 U/L (ref 10–40)
ANION GAP SERPL CALCULATED.3IONS-SCNC: 9 MMOL/L (ref 3–16)
AST SERPL-CCNC: 20 U/L (ref 15–37)
BILIRUB SERPL-MCNC: 0.7 MG/DL (ref 0–1)
BUN SERPL-MCNC: 24 MG/DL (ref 7–20)
CALCIUM SERPL-MCNC: 9.1 MG/DL (ref 8.3–10.6)
CHLORIDE SERPL-SCNC: 102 MMOL/L (ref 99–110)
CHOLEST SERPL-MCNC: 169 MG/DL (ref 0–199)
CO2 SERPL-SCNC: 26 MMOL/L (ref 21–32)
CREAT SERPL-MCNC: 1.1 MG/DL (ref 0.8–1.3)
DEPRECATED RDW RBC AUTO: 14.6 % (ref 12.4–15.4)
EST. AVERAGE GLUCOSE BLD GHB EST-MCNC: 108.3 MG/DL
GFR SERPLBLD CREATININE-BSD FMLA CKD-EPI: 76 ML/MIN/{1.73_M2}
GLUCOSE SERPL-MCNC: 105 MG/DL (ref 70–99)
HBA1C MFR BLD: 5.4 %
HCT VFR BLD AUTO: 44.4 % (ref 40.5–52.5)
HDLC SERPL-MCNC: 37 MG/DL (ref 40–60)
HGB BLD-MCNC: 14.9 G/DL (ref 13.5–17.5)
LDLC SERPL CALC-MCNC: 117 MG/DL
MCH RBC QN AUTO: 30.4 PG (ref 26–34)
MCHC RBC AUTO-ENTMCNC: 33.6 G/DL (ref 31–36)
MCV RBC AUTO: 90.4 FL (ref 80–100)
PLATELET # BLD AUTO: 246 K/UL (ref 135–450)
PMV BLD AUTO: 9.5 FL (ref 5–10.5)
POTASSIUM SERPL-SCNC: 4.4 MMOL/L (ref 3.5–5.1)
PROT SERPL-MCNC: 7.4 G/DL (ref 6.4–8.2)
PSA SERPL DL<=0.01 NG/ML-MCNC: 0.64 NG/ML (ref 0–4)
RBC # BLD AUTO: 4.91 M/UL (ref 4.2–5.9)
SODIUM SERPL-SCNC: 137 MMOL/L (ref 136–145)
TRIGL SERPL-MCNC: 74 MG/DL (ref 0–150)
URATE SERPL-MCNC: 5.5 MG/DL (ref 3.5–7.2)
VLDLC SERPL CALC-MCNC: 15 MG/DL
WBC # BLD AUTO: 6.5 K/UL (ref 4–11)

## 2025-03-26 PROCEDURE — 83036 HEMOGLOBIN GLYCOSYLATED A1C: CPT

## 2025-03-26 PROCEDURE — 84153 ASSAY OF PSA TOTAL: CPT

## 2025-03-26 PROCEDURE — 85027 COMPLETE CBC AUTOMATED: CPT

## 2025-03-26 PROCEDURE — 99396 PREV VISIT EST AGE 40-64: CPT | Performed by: FAMILY MEDICINE

## 2025-03-26 PROCEDURE — 84270 ASSAY OF SEX HORMONE GLOBUL: CPT

## 2025-03-26 PROCEDURE — 3074F SYST BP LT 130 MM HG: CPT | Performed by: FAMILY MEDICINE

## 2025-03-26 PROCEDURE — 80053 COMPREHEN METABOLIC PANEL: CPT

## 2025-03-26 PROCEDURE — 36415 COLL VENOUS BLD VENIPUNCTURE: CPT

## 2025-03-26 PROCEDURE — 84550 ASSAY OF BLOOD/URIC ACID: CPT

## 2025-03-26 PROCEDURE — 84403 ASSAY OF TOTAL TESTOSTERONE: CPT

## 2025-03-26 PROCEDURE — 80061 LIPID PANEL: CPT

## 2025-03-26 PROCEDURE — 3078F DIAST BP <80 MM HG: CPT | Performed by: FAMILY MEDICINE

## 2025-03-26 PROCEDURE — 99214 OFFICE O/P EST MOD 30 MIN: CPT | Performed by: FAMILY MEDICINE

## 2025-03-26 RX ORDER — METOPROLOL SUCCINATE 100 MG/1
100 TABLET, EXTENDED RELEASE ORAL DAILY
Qty: 90 TABLET | Refills: 3 | Status: SHIPPED | OUTPATIENT
Start: 2025-03-26

## 2025-03-26 RX ORDER — HYDROCHLOROTHIAZIDE 12.5 MG/1
12.5 CAPSULE ORAL EVERY MORNING
Qty: 90 CAPSULE | Refills: 3 | Status: SHIPPED | OUTPATIENT
Start: 2025-03-26

## 2025-03-26 RX ORDER — AMLODIPINE BESYLATE 10 MG/1
10 TABLET ORAL DAILY
Qty: 90 TABLET | Refills: 3 | Status: SHIPPED | OUTPATIENT
Start: 2025-03-26

## 2025-03-26 RX ORDER — LISINOPRIL 40 MG/1
40 TABLET ORAL DAILY
Qty: 90 TABLET | Refills: 3 | Status: SHIPPED | OUTPATIENT
Start: 2025-03-26

## 2025-03-26 RX ORDER — SPIRONOLACTONE 100 MG/1
100 TABLET, FILM COATED ORAL DAILY
Qty: 90 TABLET | Refills: 3 | Status: CANCELLED | OUTPATIENT
Start: 2025-03-26

## 2025-03-26 SDOH — ECONOMIC STABILITY: FOOD INSECURITY: WITHIN THE PAST 12 MONTHS, THE FOOD YOU BOUGHT JUST DIDN'T LAST AND YOU DIDN'T HAVE MONEY TO GET MORE.: NEVER TRUE

## 2025-03-26 SDOH — ECONOMIC STABILITY: FOOD INSECURITY: WITHIN THE PAST 12 MONTHS, YOU WORRIED THAT YOUR FOOD WOULD RUN OUT BEFORE YOU GOT MONEY TO BUY MORE.: NEVER TRUE

## 2025-03-26 ASSESSMENT — PATIENT HEALTH QUESTIONNAIRE - PHQ9
2. FEELING DOWN, DEPRESSED OR HOPELESS: NOT AT ALL
SUM OF ALL RESPONSES TO PHQ QUESTIONS 1-9: 0
1. LITTLE INTEREST OR PLEASURE IN DOING THINGS: NOT AT ALL
SUM OF ALL RESPONSES TO PHQ QUESTIONS 1-9: 0

## 2025-03-26 NOTE — PATIENT INSTRUCTIONS
--Bring in copy of living will and healthcare power of  for your chart.      --Check with pharmacy about getting the shingles vaccine, Shingrix (not Zostavax)      --Make appointment to see the eye doctor     --Make appointment to see the dentist

## 2025-03-26 NOTE — PROGRESS NOTES
Office Note: Annual Physical  3/26/2025  Patient Name: Rayshawn Maloney  MRN: [id number] : 1965      SUBJECTIVE:     CHIEF COMPLAINT:  Chief Complaint   Patient presents with    Annual Exam       HISTORY OF PRESENT ILLNESS:  He presents today for an annual physical.    HTN  He reports that he is currently taking Amlodipine 10mg, HCTZ 12.5mg, lisinopril 40mg, and metoprolol 100mg. He denies any side effects from any of these medications. He reports rare edema in his BLE, but it mostly occurs when he does a lot of walking, such as when he is on vacation. He only reports very rare orthostatic symptoms, mostly when he has not had enough water to drink. He takes his BP at home occasionally, and notes that it averages 100-110s/70s. He stopped taking spironolactone about 1 year ago.      Pre-diabetes   He is not currently taking any medications. He has been trying to improve his diet, exercise more, and implement other healthy lifestyle changes. He does not have a glucometer at home. Notably, he denies any new numbness/tingling in his extremities, nor has he had any significant changes in vision/spots in his vision. He also denies any changes in the color or smell of his urine, and he has not had any frothy urine. His last A1C was 5.6% in 2024.     Pain in left great toe and right hand  He has never received a diagnosis of gout, however, he reports that his symptoms are very similar to his son's gout flares. His first bout of pain in his left great toe was about 1.5-2 years ago. Specifically, he notes that the plantar aspect of his forefoot, lateral aspect of his great toe near the DIP joint, and the plantar aspect of his great toe became erythematous, inflamed, and tender. He notes that it was painful for him to even put bed sheets over his foot. He had a bout of pain in the left great toe again about 4-5 months ago, at which point he was unable to walk without a cane. Notably, he has had a flare over the past

## 2025-03-27 ENCOUNTER — RESULTS FOLLOW-UP (OUTPATIENT)
Dept: FAMILY MEDICINE CLINIC | Age: 60
End: 2025-03-27

## 2025-03-27 DIAGNOSIS — R22.31 LOCALIZED SWELLING ON RIGHT HAND: ICD-10-CM

## 2025-03-27 DIAGNOSIS — M79.89 SWELLING OF TOE OF LEFT FOOT: ICD-10-CM

## 2025-03-27 DIAGNOSIS — M79.675 GREAT TOE PAIN, LEFT: Primary | ICD-10-CM

## 2025-03-27 DIAGNOSIS — M25.541 METACARPOPHALANGEAL JOINT PAIN OF RIGHT HAND: ICD-10-CM

## 2025-03-28 LAB
SHBG SERPL-SCNC: 19 NMOL/L (ref 19–76)
TESTOST FREE SERPL-MCNC: 74.5 PG/ML (ref 47–244)
TESTOST SERPL-MCNC: 287 NG/DL (ref 193–740)

## 2025-03-28 NOTE — TELEPHONE ENCOUNTER
----- Message from Dr. Johanny Eng MD sent at 3/27/2025  8:41 PM EDT -----  Kidneys stable  Liver normal  Electrolytes normal  Blood count normal  Chol min elevated  10 year risk of cardiovascular event is 7% so no treatment recommended at this time (recommend at 7.5%)  -Encourage low carb diet, watching calories, regular exercise and weight loss  Sugar improved  PSA normal and stable    Uric acid level also very normal so do not think symptoms are \"gout\"   May be arthritis as discussed, recommend XR of Left great toe and right hand for further eval.   Dx: great toe pain and swelling, right 3rd MCP joint pain and swelling